# Patient Record
Sex: FEMALE | Race: WHITE
[De-identification: names, ages, dates, MRNs, and addresses within clinical notes are randomized per-mention and may not be internally consistent; named-entity substitution may affect disease eponyms.]

---

## 2019-04-01 PROBLEM — Z00.00 ENCOUNTER FOR PREVENTIVE HEALTH EXAMINATION: Status: ACTIVE | Noted: 2019-04-01

## 2019-04-15 ENCOUNTER — APPOINTMENT (OUTPATIENT)
Dept: OBGYN | Facility: CLINIC | Age: 54
End: 2019-04-15
Payer: COMMERCIAL

## 2019-04-15 VITALS
WEIGHT: 140 LBS | HEIGHT: 61 IN | BODY MASS INDEX: 26.43 KG/M2 | SYSTOLIC BLOOD PRESSURE: 114 MMHG | DIASTOLIC BLOOD PRESSURE: 71 MMHG

## 2019-04-15 DIAGNOSIS — Z78.9 OTHER SPECIFIED HEALTH STATUS: ICD-10-CM

## 2019-04-15 DIAGNOSIS — Z85.41 PERSONAL HISTORY OF MALIGNANT NEOPLASM OF CERVIX UTERI: ICD-10-CM

## 2019-04-15 DIAGNOSIS — Z01.419 ENCOUNTER FOR GYNECOLOGICAL EXAMINATION (GENERAL) (ROUTINE) W/OUT ABNORMAL FINDINGS: ICD-10-CM

## 2019-04-15 DIAGNOSIS — Z87.898 PERSONAL HISTORY OF OTHER SPECIFIED CONDITIONS: ICD-10-CM

## 2019-04-15 PROCEDURE — 99386 PREV VISIT NEW AGE 40-64: CPT

## 2019-04-15 RX ORDER — INTERFERON BETA-1A 30 MCG
KIT INTRAMUSCULAR
Refills: 0 | Status: ACTIVE | COMMUNITY

## 2019-04-15 NOTE — CHIEF COMPLAINT
[Initial Visit] : initial GYN visit [FreeTextEntry1] : Pt presents to establish care - is not due for a PAP until the summer.  \par No complaints

## 2019-04-15 NOTE — HISTORY OF PRESENT ILLNESS
[Last Mammogram ___] : Last Mammogram was [unfilled] [Last Bone Density ___] : Last bone density studies [unfilled] [Last Colonoscopy ___] : Last colonoscopy [unfilled] [Last Pap ___] : Last cervical pap smear was [unfilled] [Postmenopausal] : is postmenopausal [de-identified] : completed menopause by age 30 - not sure if related to MS. Tried HRT but then got cervical cancer.  Had LEEP 1999 - PAPs since then have been abnormal but lately have been normal.

## 2019-04-15 NOTE — PHYSICAL EXAM
[Awake] : awake [Alert] : alert [Acute Distress] : no acute distress [Mass] : no breast mass [Axillary LAD] : no axillary lymphadenopathy [Nipple Discharge] : no nipple discharge [Soft] : soft [Tender] : non tender [Oriented x3] : oriented to person, place, and time [No Bleeding] : there was no active vaginal bleeding [Uterine Adnexae] : were not tender and not enlarged [Normal] : uterus

## 2019-04-18 LAB
CYTOLOGY CVX/VAG DOC THIN PREP: NORMAL
HPV HIGH+LOW RISK DNA PNL CVX: DETECTED

## 2019-04-22 ENCOUNTER — RESULT REVIEW (OUTPATIENT)
Age: 54
End: 2019-04-22

## 2019-05-02 ENCOUNTER — OTHER (OUTPATIENT)
Age: 54
End: 2019-05-02

## 2019-05-14 ENCOUNTER — APPOINTMENT (OUTPATIENT)
Dept: ENDOCRINOLOGY | Facility: CLINIC | Age: 54
End: 2019-05-14
Payer: COMMERCIAL

## 2019-05-14 VITALS
HEART RATE: 80 BPM | BODY MASS INDEX: 26.43 KG/M2 | SYSTOLIC BLOOD PRESSURE: 110 MMHG | OXYGEN SATURATION: 98 % | DIASTOLIC BLOOD PRESSURE: 80 MMHG | WEIGHT: 140 LBS | HEIGHT: 61 IN

## 2019-05-14 DIAGNOSIS — M85.80 OTHER SPECIFIED DISORDERS OF BONE DENSITY AND STRUCTURE, UNSPECIFIED SITE: ICD-10-CM

## 2019-05-14 DIAGNOSIS — E28.8 OTHER OVARIAN DYSFUNCTION: ICD-10-CM

## 2019-05-14 PROCEDURE — 99244 OFF/OP CNSLTJ NEW/EST MOD 40: CPT

## 2019-05-14 RX ORDER — ALBUTEROL SULFATE 90 UG/1
108 (90 BASE) AEROSOL, METERED RESPIRATORY (INHALATION)
Qty: 18 | Refills: 0 | Status: COMPLETED | COMMUNITY
Start: 2019-05-02

## 2019-05-14 RX ORDER — BROMPHENIRAMINE MALEATE, PSEUDOEPHEDRINE HYDROCHLORIDE, 2; 30; 10 MG/5ML; MG/5ML; MG/5ML
30-2-10 SYRUP ORAL
Qty: 200 | Refills: 0 | Status: COMPLETED | COMMUNITY
Start: 2019-05-02

## 2019-05-14 RX ORDER — FLUTICASONE PROPIONATE 110 UG/1
110 AEROSOL, METERED RESPIRATORY (INHALATION)
Qty: 12 | Refills: 0 | Status: COMPLETED | COMMUNITY
Start: 2019-05-02

## 2019-05-14 NOTE — CONSULT LETTER
[Dear  ___] : Dear  [unfilled], [Consult Letter:] : I had the pleasure of evaluating your patient, [unfilled]. [Please see my note below.] : Please see my note below. [Consult Closing:] : Thank you very much for allowing me to participate in the care of this patient.  If you have any questions, please do not hesitate to contact me. [Sincerely,] : Sincerely, [FreeTextEntry3] : Arnaldo Alamo MD\par  [FreeTextEntry2] : Maritza Hensley

## 2019-05-14 NOTE — HISTORY OF PRESENT ILLNESS
[None] : The patient is not currently taking any medication for this problem [Low Vit D Intake/Exposure] : low vitamin D intake [Premat. Menopause (natural)] : premature menopause which occurred naturally [Family History of Breast Cancer] : family history of breast cancer [Loss of Balance] : loss of balance [Amenorrhea] : no past or present history of amenorrhea [Low Calcium Intake] : no past or present history of low calcium intake [Kidney Stones] : no history of kidney stones [Disordered Eating] : no past or present history of disordered eating [Taking Steroids] : no past or present history of taking steroids [Excessive Alcohol Intake] : no past or present history of excessive alcohol intake [Excessive Soda] : no past or present history of excessive soda intake [Sedentary] : no past or present history of a sedentary lifestyle [High Fall Risk] : no fall risk [Current Smoking___(ppd)] : not currently smoking [Frequent Falls] : no frequent falls [Uses a Walker/Cane] : no use of a walker/cane [Family History of Osteoporosis] : no family history of osteoporosis [Family History of Hip Fracture] : no family history of hip fracture [Regular Dental Follow-Up] : no regular dental follow-up [Hyperparathyroidism] : no hyperparathyroidism [History of Radiation Therapy] : no history of radiation therapy [History of Blood Clots] : no history of blood clots [Inability to Stand Straight] : no inability to stand straight [Loss of Height] : no loss of height [Difficulty with Stairs] : no difficulty with stairs [Difficulty Ambulating] : no difficulty ambulating [Difficulty with Posture] : no difficulty with posture [Arthralgias] : no arthralgias [Weight Gain] : no weight gain [Change in Clothing Fit] : no change in clothing fit [Myalgias] : no myalgias [Hip Pain] : no hip pain [Previous Fragility Fracture] : no previous fragility fracture [Wrist Pain] : no wrist pain [FreeTextEntry1] : leafy cream, she eats cheese, yogurt.  She was taking Vitamin D before and stopped around 2000.

## 2019-05-14 NOTE — ASSESSMENT
[FreeTextEntry1] : Ms. EMANUEL MONROY is a very pleasant 54 year old female with history of MS, cervical CA, premature ovarian failure, here to establish endocrine care for osteopenia. \par \par \par #1 Osteopenia: T score of -2.2, site unknown.  Will try to contact radiologist to get complete report.  Osteopenia. \par He currently does not meet treatment criteria for osteopenia.  However, we will definitely well secondary cause of osteopenia in this young female.  The most likely reason for her worsening bone loss may be attributed to premature ovarian failure in the setting as possible multiple sclerosis.  She's not able to be on hormone replacement therapy secondary to a history of cervical cancer. \par We will obtain the following labs\par Complete metabolic panel and phosphorous\par Serum Dihydroxyvitamin D\par TSH, free T4\par \par I have recommended the following\par -Get enough calcium and vitamin D, either through diet or\par supplements (at least 1,000-1,200 mg of calcium; 1000-2000IU of vitamin D daily under age 50 or 800-1,000 IU after age 50)\par - Do weight-bearing exercises and stay physically fit, will give referral to physical therapy.  \par - Avoid smoking\par - Don’t drink too much alcohol\par \par Discussed the indications for treatment as well as treatment options including, Bisphosphonates (alendronate, risedronate, ibandronate, zoledronic acid, Denosumab, Raloxifene, as well as teriparatide.  \par \par We will followup with patient in one year.  We will repeat a bone mineral density at that time in our office.\par

## 2019-05-14 NOTE — PHYSICAL EXAM
[No Acute Distress] : no acute distress [Alert] : alert [Well Nourished] : well nourished [Well Developed] : well developed [Normal Sclera/Conjunctiva] : normal sclera/conjunctiva [No Proptosis] : no proptosis [EOMI] : extra ocular movement intact [Thyroid Not Enlarged] : the thyroid was not enlarged [Normal Oropharynx] : the oropharynx was normal [No Respiratory Distress] : no respiratory distress [No Thyroid Nodules] : there were no palpable thyroid nodules [No Accessory Muscle Use] : no accessory muscle use [Clear to Auscultation] : lungs were clear to auscultation bilaterally [Normal Rate] : heart rate was normal  [Normal S1, S2] : normal S1 and S2 [Regular Rhythm] : with a regular rhythm [Pedal Pulses Normal] : the pedal pulses are present [No Edema] : there was no peripheral edema [Normal Bowel Sounds] : normal bowel sounds [Not Tender] : non-tender [Soft] : abdomen soft [Post Cervical Nodes] : posterior cervical nodes [Not Distended] : not distended [Axillary Nodes] : axillary nodes [Anterior Cervical Nodes] : anterior cervical nodes [Normal] : normal and non tender [No Spinal Tenderness] : no spinal tenderness [Spine Straight] : spine straight [No Stigmata of Cushings Syndrome] : no stigmata of cushings syndrome [Normal Gait] : normal gait [Normal Strength/Tone] : muscle strength and tone were normal [No Rash] : no rash [Normal Reflexes] : deep tendon reflexes were 2+ and symmetric [Oriented x3] : oriented to person, place, and time [No Tremors] : no tremors [Acanthosis Nigricans] : no acanthosis nigricans [de-identified] : slight left eye droop

## 2019-05-23 LAB
24R-OH-CALCIDIOL SERPL-MCNC: 98.3 PG/ML
25(OH)D3 SERPL-MCNC: 40.3 NG/ML
ALBUMIN SERPL ELPH-MCNC: 4.5 G/DL
ALP BLD-CCNC: 82 U/L
ALT SERPL-CCNC: 26 U/L
ANION GAP SERPL CALC-SCNC: 13 MMOL/L
AST SERPL-CCNC: 20 U/L
BILIRUB SERPL-MCNC: 0.2 MG/DL
BUN SERPL-MCNC: 8 MG/DL
CALCIUM SERPL-MCNC: 10 MG/DL
CALCIUM SERPL-MCNC: 10 MG/DL
CHLORIDE SERPL-SCNC: 101 MMOL/L
CO2 SERPL-SCNC: 27 MMOL/L
CREAT SERPL-MCNC: 0.7 MG/DL
GLUCOSE SERPL-MCNC: 84 MG/DL
MAGNESIUM SERPL-MCNC: 1.9 MG/DL
PARATHYROID HORMONE INTACT: 34 PG/ML
PHOSPHATE SERPL-MCNC: 3.2 MG/DL
POTASSIUM SERPL-SCNC: 4.6 MMOL/L
PROT SERPL-MCNC: 7.1 G/DL
SODIUM SERPL-SCNC: 141 MMOL/L
T4 FREE SERPL-MCNC: 0.9 NG/DL
TSH SERPL-ACNC: 2.17 UIU/ML

## 2019-07-10 ENCOUNTER — APPOINTMENT (OUTPATIENT)
Dept: ENDOCRINOLOGY | Facility: CLINIC | Age: 54
End: 2019-07-10

## 2020-12-19 ENCOUNTER — OUTPATIENT (OUTPATIENT)
Dept: OUTPATIENT SERVICES | Facility: HOSPITAL | Age: 55
LOS: 1 days | End: 2020-12-19
Payer: COMMERCIAL

## 2020-12-19 ENCOUNTER — APPOINTMENT (OUTPATIENT)
Dept: MRI IMAGING | Facility: CLINIC | Age: 55
End: 2020-12-19
Payer: COMMERCIAL

## 2020-12-19 DIAGNOSIS — Z00.8 ENCOUNTER FOR OTHER GENERAL EXAMINATION: ICD-10-CM

## 2020-12-19 PROCEDURE — 70553 MRI BRAIN STEM W/O & W/DYE: CPT | Mod: 26

## 2020-12-19 PROCEDURE — 70553 MRI BRAIN STEM W/O & W/DYE: CPT

## 2020-12-21 PROBLEM — Z01.419 ENCOUNTER FOR ANNUAL ROUTINE GYNECOLOGICAL EXAMINATION: Status: RESOLVED | Noted: 2019-04-15 | Resolved: 2020-12-21

## 2021-07-22 ENCOUNTER — EMERGENCY (EMERGENCY)
Facility: HOSPITAL | Age: 56
LOS: 1 days | Discharge: HOME | End: 2021-07-22
Attending: STUDENT IN AN ORGANIZED HEALTH CARE EDUCATION/TRAINING PROGRAM
Payer: COMMERCIAL

## 2021-07-22 VITALS
HEIGHT: 61 IN | WEIGHT: 139.99 LBS | OXYGEN SATURATION: 97 % | TEMPERATURE: 98 F | RESPIRATION RATE: 18 BRPM | HEART RATE: 100 BPM | DIASTOLIC BLOOD PRESSURE: 65 MMHG | SYSTOLIC BLOOD PRESSURE: 123 MMHG

## 2021-07-22 VITALS
DIASTOLIC BLOOD PRESSURE: 77 MMHG | SYSTOLIC BLOOD PRESSURE: 127 MMHG | OXYGEN SATURATION: 99 % | HEART RATE: 95 BPM | RESPIRATION RATE: 20 BRPM | TEMPERATURE: 98 F

## 2021-07-22 PROCEDURE — 73590 X-RAY EXAM OF LOWER LEG: CPT

## 2021-07-22 PROCEDURE — 29515 APPLICATION SHORT LEG SPLINT: CPT

## 2021-07-22 PROCEDURE — 73620 X-RAY EXAM OF FOOT: CPT | Mod: 26,RT

## 2021-07-22 PROCEDURE — 73610 X-RAY EXAM OF ANKLE: CPT | Mod: 26,RT

## 2021-07-22 PROCEDURE — 29515 APPLICATION SHORT LEG SPLINT: CPT | Mod: RT

## 2021-07-22 PROCEDURE — 73620 X-RAY EXAM OF FOOT: CPT

## 2021-07-22 PROCEDURE — 73610 X-RAY EXAM OF ANKLE: CPT

## 2021-07-22 PROCEDURE — 73590 X-RAY EXAM OF LOWER LEG: CPT | Mod: 26,RT

## 2021-07-22 PROCEDURE — 99284 EMERGENCY DEPT VISIT MOD MDM: CPT | Mod: 25

## 2021-07-22 RX ADMIN — Medication 375 MILLIGRAM(S): at 17:29

## 2021-07-22 NOTE — ED ADULT NURSE NOTE - OBJECTIVE STATEMENT
55 y/o female presents to ED via EMS c/o R ankle pain. pt states she was getting off bus and stepped into pothole, twisting ankle. Per EMS, pt is able to bear some weight though verbalized pain. R lateral ankle appears swollen. No obvious deformity noted.

## 2021-07-22 NOTE — ED ADULT NURSE NOTE - CCCP TRG CHIEF CMPLNT
[facility] [place]  [address] [address]  [phone]      Ly Stone :1980 MRN:[id number]    2021 Time Session Began: 04:30pm  Time Session Ended: 5:15pm    Due to COVID-19 precautions, this visit was performed via live interactive two-way Video visit with patient's verbal consent.   Clinician Location:Clinic.  Patient Location: Home.  Verified patient identity:  [x] Yes    Session Type:45 Minute Therapy     Others Present: Patient was seen alone    Intervention: Behavioral, Cognitive, Supportive    Suicide/Homicide/Violence Ideation: No    If Yes, explain: None reported    Current Outpatient Medications   Medication Sig   • FLUoxetine (PROzac) 10 MG capsule Take 20 mg (2 tablets) in AM and 10 mg (1 tablet) in PM for one week then increase to 20 mg twice daily.   • fluconazole (Diflucan) 150 MG tablet Take 1 tablet by mouth 1 time for 1 dose. Dose may be repeated after 72 hours if needed.   • HYDROcodone-acetaminophen (NORCO) 5-325 MG per tablet Take 1 tablet by mouth every 6 hours as needed for Pain.   • ibuprofen (MOTRIN) 600 MG tablet TAKE 1 TABLET BY MOUTH EVERY 6 TO 8 HOURS AS NEEDED FOR PAIN   • cyclobenzaprine (FLEXERIL) 10 MG tablet Take 1 tablet by mouth 3 times daily as needed for Muscle spasms.   • buPROPion (WELLBUTRIN SR) 100 MG 12 hr tablet Take 1 tablet by mouth 2 times daily.   • cetirizine (ZYRTEC) 10 MG tablet Take 1 tablet by mouth daily.     No current facility-administered medications for this visit.       Change in Medication(s) Reported: No  If Yes, explain: Patient began taking her medication at a different time of day to determine if that would help to reduce anxiety in the AM. Patient finds new medication regimen helpful and will continue. Working with her doctor to determine if she would like to increase or remain at same dose.     Patient/Family  Education Provided: Yes  Patient/Family Displays Understanding: Yes    If No, explain: n/a    Chief complaint in patient's own words: \"I lost one of my close friends\"    Progress Note containing chief complaint and symptoms/problems related to the complaint:    (Data/Action/Response/Plan)    D: Ly is a 41 year old female who presents to treatment with symptoms of anxiety and depression. Patient reports loss of close friend and processed the experience thus far. Patient identified goals to refocus this week and slowly continue to process the loss.      Action of the provider: Patient was provided with support. Patient's concerns related to recent loss  were processed and reframed to build insight. Cognitions shared by patient were acknowledged and exploration was encouraged. Normalized grief and offered support.  Intervention: Behavioral, Cognitive, Supportive     Response of patient: Ly was alert and oriented x4. Patient presented motivated. Patient presented as calm and cooperative. Mood appeared depressive with congruent affect.  Eye contact was appropriate. Speech was normal in rate, tone, and volume. Motor activity was unremarkable. Thought process was future oriented and goal directed. Patient processed death of close friend. Patient was open and expressive.      P: Patient continue goal of organization. Focus on processing loss.     Need for Community Resources Assessed: No    Resources Needed: N/A    If Yes, what resources: n/a    Primary Diagnosis: Adjustment Disorder w/Anxiety and Adjustment Disorder w/Depressed Mood  : 1 Mild    Treatment Plan: Unchanged    Discharge Plan: Strategies Discussed to Maintain Gains, Continue with M.D.    Next Appointment: Within 2 weeks  Ly Kaur PSYD              pain, ankle

## 2021-07-22 NOTE — ED ADULT NURSE NOTE - NS ED NURSE LEVEL OF CONSCIOUSNESS MENTAL STATUS
11 AM:   Take 1 tablet of Lisinopril-HCTZ (blood pressure plus fluid pill)   Take 1 tablet of Alogliptin (diabetes)   Take 1 tablet of Atorvastatin (cholesterol)   Take 1 tablet of Baby aspirin 81 mg     11 PM    Take 27 units of Lantus      Insulin Dosing with Sliding Scale:       Inject Lantus (Gardner)  27 units nightly  - this is your basal insulin    Monitor blood sugar before meals and at bedtime.  Call physician if blood sugar < 60 or > 350 for two consecutive readings.          Glucose  Novolog Insulin Subcutaneous (Orange)        0 - 60   Orange juice or glucose tablet, hold insulin      No additional insulin   201-250  2 units   251-300  4 units   301-350  6 units   351-400  8 units   >400   10 units then call physician    
Awake/Alert/Cooperative

## 2021-07-22 NOTE — ED PROVIDER NOTE - PATIENT PORTAL LINK FT
You can access the FollowMyHealth Patient Portal offered by [facility] by registering at the following website: [email or web address]. By joining [facility]’s FollowMyHealth portal, you will also be able to view your health information using other applications (apps) compatible with our system.

## 2021-07-22 NOTE — ED PROVIDER NOTE - NSFOLLOWUPCLINICS_GEN_ALL_ED_FT
[facility]  Orthopedic Surgery  [address] [place] [address]  Phone: [phone]  Fax:     [facility]  Orthopedic Surgery  [address], 3rd & 4th [place] [address]  Phone: [phone]  Fax:   Follow Up Time: 4-6 Days    [facility]  Orthopedic Surgery  [address] [address]  Phone: [phone]  Fax:

## 2021-07-22 NOTE — ED PROVIDER NOTE - NSFOLLOWUPINSTRUCTIONS_ED_ALL_ED_FT
You were evaluated in the emergency department and found to have an ankle fracture.     Please follow up with orthopedics as discussed in 3-5 days.     Do not bear weight on ankle, use crutches. Keep cast clean and dry. Elevate the leg. Use ice. You can take tylenol or ibuprofen for pain. To control your pain at home, you should take Ibuprofen 400 mg along with Tylenol 650mg-1000mg every 6 to 8 hours. Limit your maximum daily Tylenol from all sources to 4000mg. Be aware that many other medications contain acetaminophen which is also known as Tylenol. Taking Tylenol and Ibuprofen together has been shown to be more effective at relieving pain than taking them separately. These are both over the counter medications that you can  at your local pharmacy without a prescription. You need to respect all of the warnings on the bottles. You shouldn’t take these medications for more than a week without following up with your doctor. Both medications come with certain risks and side effects that you need to discuss with your doctor, especially if you are taking them for a prolonged period    Please return to the emergency department with any new or worsening symptoms, including:  -Worsening pain  -Numbness or tingling in leg  -You can't feel the ankle  -Your foot becomes discolored (black or blue)    Ankle Fracture    WHAT YOU NEED TO KNOW:  An ankle fracture is a break in 1 or more of the bones in your ankle.  Foot Anatomy  DISCHARGE INSTRUCTIONS:  Call your local emergency number (911 in the [place]) for any of the following:  You feel lightheaded, short of breath, and have chest pain.  You cough up blood.  Seek care immediately if:  Your leg feels warm, tender, and painful. It may look swollen and red.  Blood soaks through your bandage.  You have severe pain in your ankle.  Your cast feels too tight.  Your foot or toes are cold or numb.  Your foot or toenails turn blue or gray.  Call your doctor if:  Your splint feels too tight.  Your swelling has increased or returned.  You have a fever.  Your pain does not go away, even after treatment.  You have questions or concerns about your condition or care.  Medicines: You may need any of the following:  Acetaminophen decreases pain and fever. It is available without a doctor's order. Ask how much to take and how often to take it. Follow directions. Read the labels of all other medicines you are using to see if they also contain acetaminophen, or ask your doctor or pharmacist. Acetaminophen can cause liver damage if not taken correctly. Do not use more than 4 grams (4,000 milligrams) total of acetaminophen in one day.  NSAIDs, such as ibuprofen, help decrease swelling, pain, and fever. This medicine is available with or without a doctor's order. NSAIDs can cause stomach bleeding or kidney problems in certain people. If you take blood thinner medicine, always ask your healthcare provider if NSAIDs are safe for you. Always read the medicine label and follow directions.  Prescription pain medicine may be given. Ask your healthcare provider how to take this medicine safely. Some prescription pain medicines contain acetaminophen. Do not take other medicines that contain acetaminophen without talking to your healthcare provider. Too much acetaminophen may cause liver damage. Prescription pain medicine may cause constipation. Ask your healthcare provider how to prevent or treat constipation.  Take your medicine as directed. Contact your healthcare provider if you think your medicine is not helping or if you have side effects. Tell him or her if you are allergic to any medicine. Keep a list of the medicines, vitamins, and herbs you take. Include the amounts, and when and why you take them. Bring the list or the pill bottles to follow-up visits. Carry your medicine list with you in case of an emergency.  Follow up with your doctor in 1 to 2 days: Your fracture may need to be reduced (bones pushed back into place) or you may need surgery. Write down your questions so you remember to ask them during your visits.  Support devices: You will be given a brace, cast, or splint to limit your movement and protect your ankle. You may need to use crutches to protect your ankle and decrease your pain as you move around. Do not remove your device and do not put weight on your injured ankle.  Splint and cast care: Cover the splint or cast before you bathe so it does not get wet. Tape 2 plastic trash bags to your skin above the cast. Try to keep your ankle out of the water as much as possible.  Rest: Rest your ankle so that it can heal. Return to normal activities as directed.  Ice: Apply ice on your ankle for 15 to 20 minutes every hour or as directed. Use an ice pack, or put crushed ice in a plastic bag. Cover it with a towel. Ice helps prevent tissue damage and decreases swelling and pain.  Elevate: Elevate your ankle above the level of your heart as often as you can. This will help decrease swelling and pain. Prop your ankle on pillows or blankets to keep it elevated comfortably.

## 2021-07-22 NOTE — ED PROVIDER NOTE - CLINICAL SUMMARY MEDICAL DECISION MAKING FREE TEXT BOX
55 y/o F p/w right ankle pain after rolling ankle getting off bus. Concern for fracture vs strain. Will check X-ray, pain control, and reassess. 57 y/o F p/w right ankle pain after rolling ankle getting off bus. Concern for fracture vs strain. Will check X-ray, pain control, and reassess. - Nancy Licea DO

## 2021-07-22 NOTE — ED PROVIDER NOTE - PROGRESS NOTE DETAILS
Holli Dwyer PGY-2: +distal fibular fx, non displaced. Will place in posterior splint, crutches. Ortho f/u. Holli Dwyer PGY-2: Patient placed in posterior splint. Holli Dwyer PGY-2: Patient placed in posterior splint. Instructed how to use crutches.

## 2021-07-22 NOTE — ED PROVIDER NOTE - MUSCULOSKELETAL MINIMAL EXAM
right ankle with swelling and tenderness to lateral malleolus. Pain with inversion. DP pulses equal bilaterally. cap refill <2 seconds. Sensation intact. No pain to right shin or knee. Right knee with FROM.

## 2021-07-22 NOTE — ED PROVIDER NOTE - OBJECTIVE STATEMENT
57 y/o F p/w R ankle after stepping off shuttle bus and rolling ankle. Did not fall and hit head. No LOC. Has not walked since incident. Pt repots pain to lateral ankle. Denies numbness, tingling.

## 2021-07-27 ENCOUNTER — APPOINTMENT (OUTPATIENT)
Dept: ORTHOPEDIC SURGERY | Facility: CLINIC | Age: 56
End: 2021-07-27
Payer: OTHER MISCELLANEOUS

## 2021-07-27 PROCEDURE — 99203 OFFICE O/P NEW LOW 30 MIN: CPT | Mod: 57

## 2021-07-27 PROCEDURE — 27786 TREATMENT OF ANKLE FRACTURE: CPT | Mod: RT

## 2021-07-27 PROCEDURE — 99072 ADDL SUPL MATRL&STAF TM PHE: CPT

## 2021-07-27 PROCEDURE — 73610 X-RAY EXAM OF ANKLE: CPT | Mod: RT

## 2021-08-26 ENCOUNTER — NON-APPOINTMENT (OUTPATIENT)
Age: 56
End: 2021-08-26

## 2021-09-10 ENCOUNTER — TRANSCRIPTION ENCOUNTER (OUTPATIENT)
Age: 56
End: 2021-09-10

## 2021-09-10 ENCOUNTER — APPOINTMENT (OUTPATIENT)
Dept: ORTHOPEDIC SURGERY | Facility: CLINIC | Age: 56
End: 2021-09-10
Payer: OTHER MISCELLANEOUS

## 2021-09-10 ENCOUNTER — NON-APPOINTMENT (OUTPATIENT)
Age: 56
End: 2021-09-10

## 2021-09-10 PROCEDURE — 99024 POSTOP FOLLOW-UP VISIT: CPT

## 2021-09-30 ENCOUNTER — APPOINTMENT (OUTPATIENT)
Dept: ORTHOPEDIC SURGERY | Facility: CLINIC | Age: 56
End: 2021-09-30
Payer: OTHER MISCELLANEOUS

## 2021-09-30 DIAGNOSIS — S82.891D OTHER FRACTURE OF RIGHT LOWER LEG, SUBSEQUENT ENCOUNTER FOR CLOSED FRACTURE WITH ROUTINE HEALING: ICD-10-CM

## 2021-09-30 DIAGNOSIS — S96.911D STRAIN OF UNSPECIFIED MUSCLE AND TENDON AT ANKLE AND FOOT LEVEL, RIGHT FOOT, SUBSEQUENT ENCOUNTER: ICD-10-CM

## 2021-09-30 PROCEDURE — 99024 POSTOP FOLLOW-UP VISIT: CPT

## 2021-12-13 ENCOUNTER — APPOINTMENT (OUTPATIENT)
Dept: MRI IMAGING | Facility: CLINIC | Age: 56
End: 2021-12-13
Payer: COMMERCIAL

## 2021-12-13 ENCOUNTER — OUTPATIENT (OUTPATIENT)
Dept: OUTPATIENT SERVICES | Facility: HOSPITAL | Age: 56
LOS: 1 days | End: 2021-12-13
Payer: COMMERCIAL

## 2021-12-13 DIAGNOSIS — Z00.8 ENCOUNTER FOR OTHER GENERAL EXAMINATION: ICD-10-CM

## 2021-12-13 PROCEDURE — 70553 MRI BRAIN STEM W/O & W/DYE: CPT | Mod: 26

## 2021-12-13 PROCEDURE — 70546 MR ANGIOGRAPH HEAD W/O&W/DYE: CPT | Mod: 26,59

## 2021-12-13 PROCEDURE — 70553 MRI BRAIN STEM W/O & W/DYE: CPT

## 2021-12-13 PROCEDURE — A9585: CPT

## 2021-12-13 PROCEDURE — 70546 MR ANGIOGRAPH HEAD W/O&W/DYE: CPT

## 2022-01-26 ENCOUNTER — APPOINTMENT (OUTPATIENT)
Dept: NEUROSURGERY | Facility: CLINIC | Age: 57
End: 2022-01-26
Payer: COMMERCIAL

## 2022-01-26 VITALS
WEIGHT: 140 LBS | OXYGEN SATURATION: 99 % | TEMPERATURE: 98.7 F | DIASTOLIC BLOOD PRESSURE: 76 MMHG | SYSTOLIC BLOOD PRESSURE: 127 MMHG | BODY MASS INDEX: 26.43 KG/M2 | HEART RATE: 53 BPM | HEIGHT: 61 IN

## 2022-01-26 DIAGNOSIS — Z83.3 FAMILY HISTORY OF DIABETES MELLITUS: ICD-10-CM

## 2022-01-26 PROCEDURE — 99205 OFFICE O/P NEW HI 60 MIN: CPT

## 2022-01-26 PROCEDURE — 99214 OFFICE O/P EST MOD 30 MIN: CPT

## 2022-01-28 NOTE — HISTORY OF PRESENT ILLNESS
[FreeTextEntry1] : Incidental small left ICA aneurysm [de-identified] : 56 year old with PMH of MS (2003)presents to the office for a neurosurgical consultation for a small aneurysm. She follows with Dr. Early for her MS. 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 MRI showed chronic white matter lesions. \par She has a positive family history of her mom being treated for a left Pcomm artery aneurysm with Platinum coils by Dr. Rangel Acevedo in 2011.\par She denies headaches, dizziness, or any other neurological symptoms. \par Plan: Cerebral angiogram by Dr. Valencia

## 2022-01-28 NOTE — PHYSICAL EXAM
[General Appearance - Alert] : alert [General Appearance - In No Acute Distress] : in no acute distress [Oriented To Time, Place, And Person] : oriented to person, place, and time [Impaired Insight] : insight and judgment were intact [Affect] : the affect was normal [Person] : oriented to person [Place] : oriented to place [Time] : oriented to time [Short Term Intact] : short term memory intact [Remote Intact] : remote memory intact [Span Intact] : the attention span was normal [Concentration Intact] : normal concentrating ability [Fluency] : fluency intact [Comprehension] : comprehension intact [Current Events] : adequate knowledge of current events [Past History] : adequate knowledge of personal past history [Vocabulary] : adequate range of vocabulary [Cranial Nerves Oculomotor (III)] : extraocular motion intact [Cranial Nerves Trigeminal (V)] : facial sensation intact symmetrically [Cranial Nerves Facial (VII)] : face symmetrical [Cranial Nerves Vestibulocochlear (VIII)] : hearing was intact bilaterally [Cranial Nerves Glossopharyngeal (IX)] : tongue and palate midline [Cranial Nerves Accessory (XI - Cranial And Spinal)] : head turning and shoulder shrug symmetric [Cranial Nerves Hypoglossal (XII)] : there was no tongue deviation with protrusion [Motor Tone] : muscle tone was normal in all four extremities [Motor Strength] : muscle strength was normal in all four extremities [No Muscle Atrophy] : normal bulk in all four extremities [Sensation Tactile Decrease] : light touch was intact [Balance] : balance was intact [Extraocular Movements] : extraocular movements were intact [Outer Ear] : the ears and nose were normal in appearance [Neck Appearance] : the appearance of the neck was normal [] : no respiratory distress [Respiration, Rhythm And Depth] : normal respiratory rhythm and effort [Exaggerated Use Of Accessory Muscles For Inspiration] : no accessory muscle use [Heart Rate And Rhythm] : heart rate was normal and rhythm regular [Abnormal Walk] : normal gait [Involuntary Movements] : no involuntary movements were seen [Skin Color & Pigmentation] : normal skin color and pigmentation [Skin Turgor] : normal skin turgor [Past-pointing] : there was no past-pointing [Tremor] : no tremor present

## 2022-01-28 NOTE — ASSESSMENT
[FreeTextEntry1] : 2022\par \par Ann Early MD\par [facility]\par  [facility], [address]\[place] [address]\par \par Re:	Renae Loza\par :	1965\par Dear Ann\par \par Thank you so much for sending me Mrs. Loza in neurosurgical consultation.  The patient is a 56 year old right handed female who is originally a native of [place], but has lived her for three years. She has a history of relapsing and remitting multiple sclerosis, and has been seen by yourself twice. On her last visit an MRI and an MRA were performed. The MRI showed stable, extensive, confluent, T2 FLAIR hyperintense signal within the white matter, consistent with MS, and the MRA shows a possible 3.6 mm superior hypophyseal aneurysm. She is here for review of the aneurysm.\par \par Her past medical history is positive for the MS as mentioned. Past surgical history is positive for tonsils and a procedure for cervical carcinoma which she says is now cured. She has a family history of a mother who had a left posterior communicating artery aneurysm, coiled by Dr. Rangel Acevedo. 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 She is neurologically intact.\par \par IMPRESSION: I looked at this very carefully, and there is a wide-based aneurysm on the left carotid, quite proximal, but outside of the cavernous sinus in the subarachnoid compartment. It is unclear exactly what the dimensions are, but given her relative young age, and given her family history, I believe she is a candidate for cerebral angiography. We are arranging outpatient cerebral angiography, and we will then characterize this aneurysm, measure it, and see if she is a candidate for treatment. If she is a candidate for treatment, she may be a candidate for a flow diverting stent.\par \par I will keep you posted regarding the results of the angiogram. I explained everything in detail to the patient, and I would just like to take one moment to thank you so much for your continued support over the years. It is very meaningful to me. I thank you so much.\par \par Sincerely,\par \par \par \par Rodri Castorena M.D., F.DEYVI.C.S.\par [facility]\par \par \par \par

## 2022-01-31 PROBLEM — Z83.3 FAMILY HISTORY OF TYPE 2 DIABETES MELLITUS: Status: ACTIVE | Noted: 2019-04-15

## 2022-01-31 NOTE — HISTORY OF PRESENT ILLNESS
[FreeTextEntry1] : The patient is a 56 year old right handed female who is originally a native of [place], but has lived here for three years. She has a history of relapsing and remitting multiple sclerosis. Past surgical history tonsillectomy and a procedure for cervical carcinoma. She has a family history of a mother who had a left posterior communicating artery aneurysm, coiled by Dr. Rangel Acevedo. 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 She is neurologically intact.\par \par She was recently evaluated by [facility] Dr. Castorena due to incidental aneurysm. 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 \par

## 2022-01-31 NOTE — PHYSICAL EXAM

## 2022-01-31 NOTE — ASSESSMENT
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

## 2022-02-14 ENCOUNTER — OUTPATIENT (OUTPATIENT)
Dept: OUTPATIENT SERVICES | Facility: HOSPITAL | Age: 57
LOS: 1 days | End: 2022-02-14
Payer: COMMERCIAL

## 2022-02-14 VITALS
WEIGHT: 147.71 LBS | SYSTOLIC BLOOD PRESSURE: 118 MMHG | TEMPERATURE: 98 F | DIASTOLIC BLOOD PRESSURE: 68 MMHG | RESPIRATION RATE: 19 BRPM | OXYGEN SATURATION: 98 % | HEIGHT: 61 IN | HEART RATE: 58 BPM

## 2022-02-14 DIAGNOSIS — Z98.890 OTHER SPECIFIED POSTPROCEDURAL STATES: Chronic | ICD-10-CM

## 2022-02-14 DIAGNOSIS — Z01.818 ENCOUNTER FOR OTHER PREPROCEDURAL EXAMINATION: ICD-10-CM

## 2022-02-14 DIAGNOSIS — Z91.89 OTHER SPECIFIED PERSONAL RISK FACTORS, NOT ELSEWHERE CLASSIFIED: ICD-10-CM

## 2022-02-14 DIAGNOSIS — I67.1 CEREBRAL ANEURYSM, NONRUPTURED: ICD-10-CM

## 2022-02-14 LAB
A1C WITH ESTIMATED AVERAGE GLUCOSE RESULT: 6 % — HIGH (ref 4–5.6)
ALBUMIN SERPL ELPH-MCNC: 4.3 G/DL — SIGNIFICANT CHANGE UP (ref 3.3–5.2)
ALP SERPL-CCNC: 88 U/L — SIGNIFICANT CHANGE UP (ref 40–120)
ALT FLD-CCNC: 27 U/L — SIGNIFICANT CHANGE UP
ANION GAP SERPL CALC-SCNC: 10 MMOL/L — SIGNIFICANT CHANGE UP (ref 5–17)
APTT BLD: 30.7 SEC — SIGNIFICANT CHANGE UP (ref 27.5–35.5)
AST SERPL-CCNC: 25 U/L — SIGNIFICANT CHANGE UP
BASOPHILS # BLD AUTO: 0.04 K/UL — SIGNIFICANT CHANGE UP (ref 0–0.2)
BASOPHILS NFR BLD AUTO: 0.7 % — SIGNIFICANT CHANGE UP (ref 0–2)
BILIRUB SERPL-MCNC: 0.3 MG/DL — LOW (ref 0.4–2)
BLD GP AB SCN SERPL QL: SIGNIFICANT CHANGE UP
BUN SERPL-MCNC: 10.7 MG/DL — SIGNIFICANT CHANGE UP (ref 8–20)
CALCIUM SERPL-MCNC: 9.5 MG/DL — SIGNIFICANT CHANGE UP (ref 8.6–10.2)
CHLORIDE SERPL-SCNC: 106 MMOL/L — SIGNIFICANT CHANGE UP (ref 98–107)
CO2 SERPL-SCNC: 26 MMOL/L — SIGNIFICANT CHANGE UP (ref 22–29)
CREAT SERPL-MCNC: 0.76 MG/DL — SIGNIFICANT CHANGE UP (ref 0.5–1.3)
EOSINOPHIL # BLD AUTO: 0.25 K/UL — SIGNIFICANT CHANGE UP (ref 0–0.5)
EOSINOPHIL NFR BLD AUTO: 4.1 % — SIGNIFICANT CHANGE UP (ref 0–6)
ESTIMATED AVERAGE GLUCOSE: 126 MG/DL — HIGH (ref 68–114)
GLUCOSE SERPL-MCNC: 94 MG/DL — SIGNIFICANT CHANGE UP (ref 70–99)
HCT VFR BLD CALC: 40.3 % — SIGNIFICANT CHANGE UP (ref 34.5–45)
HGB BLD-MCNC: 13.3 G/DL — SIGNIFICANT CHANGE UP (ref 11.5–15.5)
IMM GRANULOCYTES NFR BLD AUTO: 0.2 % — SIGNIFICANT CHANGE UP (ref 0–1.5)
INR BLD: 1.02 RATIO — SIGNIFICANT CHANGE UP (ref 0.88–1.16)
LYMPHOCYTES # BLD AUTO: 0.99 K/UL — LOW (ref 1–3.3)
LYMPHOCYTES # BLD AUTO: 16.2 % — SIGNIFICANT CHANGE UP (ref 13–44)
MCHC RBC-ENTMCNC: 29.6 PG — SIGNIFICANT CHANGE UP (ref 27–34)
MCHC RBC-ENTMCNC: 33 GM/DL — SIGNIFICANT CHANGE UP (ref 32–36)
MCV RBC AUTO: 89.6 FL — SIGNIFICANT CHANGE UP (ref 80–100)
MONOCYTES # BLD AUTO: 0.44 K/UL — SIGNIFICANT CHANGE UP (ref 0–0.9)
MONOCYTES NFR BLD AUTO: 7.2 % — SIGNIFICANT CHANGE UP (ref 2–14)
MRSA PCR RESULT.: SIGNIFICANT CHANGE UP
NEUTROPHILS # BLD AUTO: 4.4 K/UL — SIGNIFICANT CHANGE UP (ref 1.8–7.4)
NEUTROPHILS NFR BLD AUTO: 71.6 % — SIGNIFICANT CHANGE UP (ref 43–77)
PLATELET # BLD AUTO: 263 K/UL — SIGNIFICANT CHANGE UP (ref 150–400)
POTASSIUM SERPL-MCNC: 3.8 MMOL/L — SIGNIFICANT CHANGE UP (ref 3.5–5.3)
POTASSIUM SERPL-SCNC: 3.8 MMOL/L — SIGNIFICANT CHANGE UP (ref 3.5–5.3)
PROT SERPL-MCNC: 6.8 G/DL — SIGNIFICANT CHANGE UP (ref 6.6–8.7)
PROTHROM AB SERPL-ACNC: 11.8 SEC — SIGNIFICANT CHANGE UP (ref 10.6–13.6)
RBC # BLD: 4.5 M/UL — SIGNIFICANT CHANGE UP (ref 3.8–5.2)
RBC # FLD: 12.6 % — SIGNIFICANT CHANGE UP (ref 10.3–14.5)
S AUREUS DNA NOSE QL NAA+PROBE: SIGNIFICANT CHANGE UP
SARS-COV-2 RNA SPEC QL NAA+PROBE: SIGNIFICANT CHANGE UP
SODIUM SERPL-SCNC: 142 MMOL/L — SIGNIFICANT CHANGE UP (ref 135–145)
WBC # BLD: 6.13 K/UL — SIGNIFICANT CHANGE UP (ref 3.8–10.5)
WBC # FLD AUTO: 6.13 K/UL — SIGNIFICANT CHANGE UP (ref 3.8–10.5)

## 2022-02-14 PROCEDURE — 71046 X-RAY EXAM CHEST 2 VIEWS: CPT

## 2022-02-14 PROCEDURE — 93005 ELECTROCARDIOGRAM TRACING: CPT

## 2022-02-14 PROCEDURE — 93010 ELECTROCARDIOGRAM REPORT: CPT

## 2022-02-14 PROCEDURE — G0463: CPT

## 2022-02-14 NOTE — H&P PST ADULT - NSANTHOSAYNRD_GEN_A_CORE
No. SIDDHARTH screening performed.  STOP BANG Legend: 0-2 = LOW Risk; 3-4 = INTERMEDIATE Risk; 5-8 = HIGH Risk

## 2022-02-14 NOTE — H&P PST ADULT - NSICDXPASTMEDICALHX_GEN_ALL_CORE_FT
PAST MEDICAL HISTORY:  Cerebral aneurysm, nonruptured     Cervical ca LEEP procedure 1999    Multiple sclerosis     No pertinent past medical history

## 2022-02-14 NOTE — H&P PST ADULT - NSICDXFAMILYHX_GEN_ALL_CORE_FT
FAMILY HISTORY:  Father  Still living? No  FH: liver cancer, Age at diagnosis: Age Unknown    Mother  Still living? Yes, Estimated age: Age Unknown  Family history of diabetes mellitus (DM), Age at diagnosis: Age Unknown  FH: brain aneurysm, Age at diagnosis: Age Unknown  FH: breast cancer, Age at diagnosis: Age Unknown

## 2022-02-14 NOTE — H&P PST ADULT - NEGATIVE NEUROLOGICAL SYMPTOMS
no transient paralysis/no weakness/no paresthesias/no generalized seizures/no syncope/no vertigo/no loss of consciousness/no confusion

## 2022-02-14 NOTE — H&P PST ADULT - HISTORY OF PRESENT ILLNESS
56 year old right handed female who is originally a native of [place], but has lived here for three years. She has a history of relapsing and remitting multiple sclerosis. Past surgical history tonsillectomy and a procedure for cervical carcinoma. She has a family history of a mother who had a left posterior communicating artery aneurysm, coiled by Dr. Rangel Acevedo. 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 She is neurologically intact.    She was recently evaluated by [facility] Dr. Castorena due to incidental aneurysm.     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 Leonor Mahajan.   57 y/o female with PMH MS presents to [facility]. Patient states she moved to [place] from [place] 03/2019, was switching doctors to establish care in [facility], was sent for MRI and was found to have cerebral aneurysm. Patient has a history of relapsing and remitting multiple sclerosis. Patient has a family history of a mother who had a left posterior communicating artery aneurysm, coiled by Dr. Rangel Acevedo. 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 Denies any numbness, tingling, loss of sensation or confusion. Patient is scheduled for cerebral angiogram with moderate sedation on 2/17/2022 with Dr. Leonor Mahajan.

## 2022-02-14 NOTE — ASU PATIENT PROFILE, ADULT - BLOOD TRANSFUSION, PREVIOUS, PROFILE
no < from: 12 Lead ECG (08.26.21 @ 16:01) >      Ventricular Rate 80 BPM    Atrial Rate 80 BPM    P-R Interval 130 ms    QRS Duration 86 ms    Q-T Interval 380 ms    QTC Calculation(Bazett) 438 ms    P Axis 82 degrees    R Axis 88 degrees    T Axis 86 degrees    Diagnosis Line NORMAL SINUS RHYTHM  NORMAL ECG  WHEN COMPARED WITH ECG OF 27-JUL-2021 18:35,  NO SIGNIFICANT CHANGE WAS FOUND  Confirmed by MD ANTHONY, CHRISTOPHER ([id number]) on 8/27/2021 4:35:45 PM    < end of copied text >

## 2022-02-14 NOTE — H&P PST ADULT - RS GEN PE MLT RESP DETAILS PC
normal/airway patent/breath sounds equal/good air movement/clear to auscultation bilaterally/no rales/no rhonchi

## 2022-02-14 NOTE — H&P PST ADULT - PROBLEM SELECTOR PLAN 1
Patient is scheduled for cerebral angiogram with moderate sedation on 2/17/2022 with Dr. Leonor Mahajan

## 2022-02-14 NOTE — ASU PATIENT PROFILE, ADULT - FALL HARM RISK - UNIVERSAL INTERVENTIONS
Bed in lowest position, wheels locked, appropriate side rails in place/Call bell, personal items and telephone in reach/Instruct patient to call for assistance before getting out of bed or chair/Non-slip footwear when patient is out of bed/[place] to call system/Physically safe environment - no spills, clutter or unnecessary equipment/Purposeful Proactive Rounding/Room/bathroom lighting operational, light cord in reach

## 2022-02-14 NOTE — H&P PST ADULT - ASSESSMENT
Patient educated on surgical scrub, COVID testing, preadmission instructions, medical clearance and day of procedure medications, verbalizes understanding. Pt instructed to stop vitamins/supplements/herbal medications/ASA/NSAIDS for one week prior to surgery and discuss with PMD.      CAPRINI SCORE [CLOT]    AGE RELATED RISK FACTORS                                                       MOBILITY RELATED FACTORS  [ ] Age 41-60 years                                            (1 Point)                  [ ] Bed rest                                                        (1 Point)  [ ] Age: 61-74 years                                           (2 Points)                 [ ] Plaster cast                                                   (2 Points)  [ ] Age= 75 years                                              (3 Points)                 [ ] Bed bound for more than 72 hours                 (2 Points)    DISEASE RELATED RISK FACTORS                                               GENDER SPECIFIC FACTORS  [ ] Edema in the lower extremities                       (1 Point)                  [ ] Pregnancy                                                     (1 Point)  [ ] Varicose veins                                               (1 Point)                  [ ] Post-partum < 6 weeks                                   (1 Point)             [ ] BMI > 25 Kg/m2                                            (1 Point)                  [ ] Hormonal therapy  or oral contraception          (1 Point)                 [ ] Sepsis (in the previous month)                        (1 Point)                  [ ] History of pregnancy complications                 (1 point)  [ ] Pneumonia or serious lung disease                                               [ ] Unexplained or recurrent                     (1 Point)           (in the previous month)                               (1 Point)  [ ] Abnormal pulmonary function test                     (1 Point)                 SURGERY RELATED RISK FACTORS  [ ] Acute myocardial infarction                              (1 Point)                 [ ]  Section                                             (1 Point)  [ ] Congestive heart failure (in the previous month)  (1 Point)               [ ] Minor surgery                                                  (1 Point)   [ ] Inflammatory bowel disease                             (1 Point)                 [ ] Arthroscopic surgery                                        (2 Points)  [ ] Central venous access                                      (2 Points)                [ ] General surgery lasting more than 45 minutes   (2 Points)       [ ] Stroke (in the previous month)                          (5 Points)               [ ] Elective arthroplasty                                         (5 Points)                                                                                                                                               HEMATOLOGY RELATED FACTORS                                                 TRAUMA RELATED RISK FACTORS  [ ] Prior episodes of VTE                                     (3 Points)                [ ] Fracture of the hip, pelvis, or leg                       (5 Points)  [ ] Positive family history for VTE                         (3 Points)                 [ ] Acute spinal cord injury (in the previous month)  (5 Points)  [ ] Prothrombin 98815 A                                     (3 Points)                 [ ] Paralysis  (less than 1 month)                             (5 Points)  [ ] Factor V Leiden                                             (3 Points)                  [ ] Multiple Trauma within 1 month                        (5 Points)  [ ] Lupus anticoagulants                                     (3 Points)                                                           [ ] Anticardiolipin antibodies                               (3 Points)                                                       [ ] High homocysteine in the blood                      (3 Points)                                             [ ] Other congenital or acquired thrombophilia      (3 Points)                                                [ ] Heparin induced thrombocytopenia                  (3 Points)                                          Total Score [          ]    Caprini Score 0 - 2:  Low Risk, No VTE Prophylaxis required for most patients, encourage ambulation  Caprini Score 3 - 6:  At Risk, pharmacologic VTE prophylaxis is indicated for most patients (in the absence of a contraindication)  Caprini Score Greater than or = 7:  High Risk, pharmacologic VTE prophylaxis is indicated for most patients (in the absence of a contraindication)    OPIOID RISK TOOL    JAYLEN EACH BOX THAT APPLIES AND ADD TOTALS AT THE END    FAMILY HISTORY OF SUBSTANCE ABUSE                 FEMALE         MALE                                                Alcohol                             [  ]1 pt          [  ]3pts                                               Illegal Durgs                     [  ]2 pts        [  ]3pts                                               Rx Drugs                           [  ]4 pts        [  ]4 pts    PERSONAL HISTORY OF SUBSTANCE ABUSE                                                                                          Alcohol                             [  ]3 pts       [  ]3 pts                                               Illegal Drugs                     [  ]4 pts        [  ]4 pts                                               Rx Drugs                           [  ]5 pts        [  ]5 pts    AGE BETWEEN 16-45 YEARS                                      [  ]1 pt         [  ]1 pt    HISTORY OF PREADOLESCENT   SEXUAL ABUSE                                                             [  ]3 pts        [  ]0pts    PSYCHOLOGICAL DISEASE                     ADD, OCD, Bipolar, Schizophrenia        [  ]2 pts         [  ]2 pts                      Depression                                               [  ]1 pt           [  ]1 pt           SCORING TOTAL   (add numbers and type here)              (***)                                     A score of 3 or lower indicated LOW risk for future opioid abuse  A score of 4 to 7 indicated moderate risk for future opioid abuse  A score of 8 or higher indicates a high risk for opioid abuse      55 y/o female with PMH MS presents to [facility]. Patient states she moved to [place] from [place] 2019, was switching doctors to establish care in [facility], was sent for MRI and was found to have cerebral aneurysm. Patient has a history of relapsing and remitting multiple sclerosis. Patient has a family history of a mother who had a left posterior communicating artery aneurysm, coiled by Dr. Rangel Acevedo. 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 Denies any numbness, tingling, loss of sensation or confusion. Patient is scheduled for cerebral angiogram with moderate sedation on 2022 with Dr. Leonor Mahajan. Patient educated on surgical scrub, COVID testing done today 2022, preadmission instructions and day of procedure medications, verbalizes understanding. Pt instructed to stop vitamins/supplements/herbal medications/ASA/NSAIDS for one week prior to surgery and discuss with PMD.      CAPRINI SCORE [CLOT]    AGE RELATED RISK FACTORS                                                       MOBILITY RELATED FACTORS  [x ] Age 41-60 years                                            (1 Point)                  [ ] Bed rest                                                        (1 Point)  [ ] Age: 61-74 years                                           (2 Points)                 [ ] Plaster cast                                                   (2 Points)  [ ] Age= 75 years                                              (3 Points)                 [ ] Bed bound for more than 72 hours                 (2 Points)    DISEASE RELATED RISK FACTORS                                               GENDER SPECIFIC FACTORS  [ ] Edema in the lower extremities                       (1 Point)                  [ ] Pregnancy                                                     (1 Point)  [ ] Varicose veins                                               (1 Point)                  [ ] Post-partum < 6 weeks                                   (1 Point)             [x ] BMI > 25 Kg/m2                                            (1 Point)                  [ ] Hormonal therapy  or oral contraception          (1 Point)                 [ ] Sepsis (in the previous month)                        (1 Point)                  [ ] History of pregnancy complications                 (1 point)  [ ] Pneumonia or serious lung disease                                               [ ] Unexplained or recurrent                     (1 Point)           (in the previous month)                               (1 Point)  [ ] Abnormal pulmonary function test                     (1 Point)                 SURGERY RELATED RISK FACTORS  [ ] Acute myocardial infarction                              (1 Point)                 [ ]  Section                                             (1 Point)  [ ] Congestive heart failure (in the previous month)  (1 Point)               [ ] Minor surgery                                                  (1 Point)   [ ] Inflammatory bowel disease                             (1 Point)                 [ ] Arthroscopic surgery                                        (2 Points)  [ ] Central venous access                                      (2 Points)                [x ] General surgery lasting more than 45 minutes   (2 Points)       [ ] Stroke (in the previous month)                          (5 Points)               [ ] Elective arthroplasty                                         (5 Points)                                                                                                                                               HEMATOLOGY RELATED FACTORS                                                 TRAUMA RELATED RISK FACTORS  [ ] Prior episodes of VTE                                     (3 Points)                [ ] Fracture of the hip, pelvis, or leg                       (5 Points)  [ ] Positive family history for VTE                         (3 Points)                 [ ] Acute spinal cord injury (in the previous month)  (5 Points)  [ ] Prothrombin 43709 A                                     (3 Points)                 [ ] Paralysis  (less than 1 month)                             (5 Points)  [ ] Factor V Leiden                                             (3 Points)                  [ ] Multiple Trauma within 1 month                        (5 Points)  [ ] Lupus anticoagulants                                     (3 Points)                                                           [ ] Anticardiolipin antibodies                               (3 Points)                                                       [ ] High homocysteine in the blood                      (3 Points)                                             [ ] Other congenital or acquired thrombophilia      (3 Points)                                                [ ] Heparin induced thrombocytopenia                  (3 Points)                                          Total Score [   4    ]    Caprini Score 0 - 2:  Low Risk, No VTE Prophylaxis required for most patients, encourage ambulation  Caprini Score 3 - 6:  At Risk, pharmacologic VTE prophylaxis is indicated for most patients (in the absence of a contraindication)  Caprini Score Greater than or = 7:  High Risk, pharmacologic VTE prophylaxis is indicated for most patients (in the absence of a contraindication)    OPIOID RISK TOOL    JAYLEN EACH BOX THAT APPLIES AND ADD TOTALS AT THE END    FAMILY HISTORY OF SUBSTANCE ABUSE                 FEMALE         MALE                                                Alcohol                             [  ]1 pt          [  ]3pts                                               Illegal Durgs                     [  ]2 pts        [  ]3pts                                               Rx Drugs                           [  ]4 pts        [  ]4 pts    PERSONAL HISTORY OF SUBSTANCE ABUSE                                                                                          Alcohol                             [  ]3 pts       [  ]3 pts                                               Illegal Drugs                     [  ]4 pts        [  ]4 pts                                               Rx Drugs                           [  ]5 pts        [  ]5 pts    AGE BETWEEN 16-45 YEARS                                      [  ]1 pt         [  ]1 pt    HISTORY OF PREADOLESCENT   SEXUAL ABUSE                                                             [  ]3 pts        [  ]0pts    PSYCHOLOGICAL DISEASE                     ADD, OCD, Bipolar, Schizophrenia        [  ]2 pts         [  ]2 pts                      Depression                                               [  ]1 pt           [  ]1 pt           SCORING TOTAL   (add numbers and type here)              (0)                                     A score of 3 or lower indicated LOW risk for future opioid abuse  A score of 4 to 7 indicated moderate risk for future opioid abuse  A score of 8 or higher indicates a high risk for opioid abuse

## 2022-02-16 ENCOUNTER — RESULT REVIEW (OUTPATIENT)
Age: 57
End: 2022-02-16

## 2022-02-16 PROCEDURE — 71046 X-RAY EXAM CHEST 2 VIEWS: CPT | Mod: 26

## 2022-02-17 ENCOUNTER — APPOINTMENT (OUTPATIENT)
Dept: NEUROSURGERY | Facility: HOSPITAL | Age: 57
End: 2022-02-17
Payer: COMMERCIAL

## 2022-02-17 ENCOUNTER — TRANSCRIPTION ENCOUNTER (OUTPATIENT)
Age: 57
End: 2022-02-17

## 2022-02-17 ENCOUNTER — OUTPATIENT (OUTPATIENT)
Dept: OUTPATIENT SERVICES | Facility: HOSPITAL | Age: 57
LOS: 1 days | End: 2022-02-17
Payer: COMMERCIAL

## 2022-02-17 VITALS
HEIGHT: 61 IN | WEIGHT: 140.21 LBS | RESPIRATION RATE: 17 BRPM | TEMPERATURE: 99 F | SYSTOLIC BLOOD PRESSURE: 123 MMHG | HEART RATE: 56 BPM | OXYGEN SATURATION: 98 % | DIASTOLIC BLOOD PRESSURE: 59 MMHG

## 2022-02-17 VITALS
HEART RATE: 64 BPM | OXYGEN SATURATION: 98 % | SYSTOLIC BLOOD PRESSURE: 119 MMHG | DIASTOLIC BLOOD PRESSURE: 56 MMHG | RESPIRATION RATE: 17 BRPM

## 2022-02-17 DIAGNOSIS — I67.1 CEREBRAL ANEURYSM, NONRUPTURED: ICD-10-CM

## 2022-02-17 DIAGNOSIS — Z98.890 OTHER SPECIFIED POSTPROCEDURAL STATES: Chronic | ICD-10-CM

## 2022-02-17 LAB — ABO RH CONFIRMATION: SIGNIFICANT CHANGE UP

## 2022-02-17 PROCEDURE — 76377 3D RENDER W/INTRP POSTPROCES: CPT

## 2022-02-17 PROCEDURE — 36226 PLACE CATH VERTEBRAL ART: CPT | Mod: 50

## 2022-02-17 PROCEDURE — 76377 3D RENDER W/INTRP POSTPROCES: CPT | Mod: 26

## 2022-02-17 PROCEDURE — C1769: CPT

## 2022-02-17 PROCEDURE — 36227 PLACE CATH XTRNL CAROTID: CPT

## 2022-02-17 PROCEDURE — C1887: CPT

## 2022-02-17 PROCEDURE — 36226 PLACE CATH VERTEBRAL ART: CPT

## 2022-02-17 PROCEDURE — C1894: CPT

## 2022-02-17 PROCEDURE — 36224 PLACE CATH CAROTD ART: CPT | Mod: 50

## 2022-02-17 PROCEDURE — 36224 PLACE CATH CAROTD ART: CPT

## 2022-02-17 PROCEDURE — 36415 COLL VENOUS BLD VENIPUNCTURE: CPT

## 2022-02-17 RX ORDER — ACETAMINOPHEN 500 MG
650 TABLET ORAL EVERY 6 HOURS
Refills: 0 | Status: DISCONTINUED | OUTPATIENT
Start: 2022-02-17 | End: 2022-03-03

## 2022-02-17 RX ORDER — SODIUM CHLORIDE 9 MG/ML
1000 INJECTION INTRAMUSCULAR; INTRAVENOUS; SUBCUTANEOUS
Refills: 0 | Status: DISCONTINUED | OUTPATIENT
Start: 2022-02-17 | End: 2022-03-03

## 2022-02-17 RX ORDER — ONDANSETRON 8 MG/1
4 TABLET, FILM COATED ORAL EVERY 4 HOURS
Refills: 0 | Status: DISCONTINUED | OUTPATIENT
Start: 2022-02-17 | End: 2022-03-03

## 2022-02-17 NOTE — DISCHARGE NOTE NURSING/CASE MANAGEMENT/SOCIAL WORK - PATIENT PORTAL LINK FT
You can access the FollowMyHealth Patient Portal offered by [facility] by registering at the following website: [email or web address]. By joining [facility]’s FollowMyHealth portal, you will also be able to view your health information using other applications (apps) compatible with our system.

## 2022-02-17 NOTE — CHART NOTE - NSCHARTNOTEFT_GEN_A_CORE
Neurointerventional Surgery Post Procedure Note    Procedure: Selective Cerebral Angiography     Pre- Procedure Diagnosis: Cerebral Aneurysm   Post- Procedure Diagnosis: Cerebral Aneursym    : Dr. Erik MD  Physician Assistant: Marshall  Nurse: Dorothy Ibarra  Anesthesiologist: Dr Hughes                                           Radiology Tech: Westley    Sheath:  5 - Georgian Sheath    I/Os: estimated blood loss less than 10cc,  IV fluids 500   cc,   Urine out put   0  cc,   Contrast: Omnipaque 150cc    Vitals :/60   HR  73 Spo2 100 %    Preliminary Report:  Under  a  5 Georgian long sheath via the right groin under MAC sedation  via left vertebral artery,  left interntal carotid artery, left external carotid artey, right vertebral artery, right internal carotid artery,  right external carotid artery, a selective cerebral angiography  was perofrmed and reveals             2.8 x 2.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 ( Official note to follow).    Patient tolerated procedure well.  Patient remains hemodynamically stable, no change in neurological status compared to baseline.  Results were discussed with patient, patient's family and Neurosurgery.  Right groin sheath  was discontinued.   Hemostasis was obtained with approximately 21 minutes of manual compression.     No active bleeding, no hematoma, no ecchymosis.   Quick clot and safeguard balloon dressing applied at 9:45am  Patient transferred to recovery room in stable condition.

## 2022-02-17 NOTE — ASU DISCHARGE PLAN (ADULT/PEDIATRIC) - NS MD DC FALL RISK RISK
For information on Fall & Injury Prevention, visit: [email or web address] OR  [email or web address] OR  https://www.cdc.gov/steadi/patient.html

## 2022-02-17 NOTE — ASU DISCHARGE PLAN (ADULT/PEDIATRIC) - CALL YOUR DOCTOR IF YOU HAVE ANY OF THE FOLLOWING:
Signs of stroke (facial droop, difficulty with vision, difficulty with speech, weakness in arms or legs, decreased sensation).  Significant bleeding or bruising at surgical site./Bleeding that does not stop/Swelling that gets worse/Pain not relieved by Medications/Wound/Surgical Site with redness, or foul smelling discharge or pus

## 2022-02-17 NOTE — DISCHARGE NOTE NURSING/CASE MANAGEMENT/SOCIAL WORK - NSDCPEFALRISK_GEN_ALL_CORE
For information on Fall & Injury Prevention, visit: [email or web address] OR  [email or web address] OR  https://www.cdc.gov/steadi/patient.html

## 2022-02-17 NOTE — CHART NOTE - NSCHARTNOTEFT_GEN_A_CORE
Neurointerventional Surgery  Pre-Procedure Note       HPI:  57 y/o female with PMH MS presents to [facility]. Patient states [place] moved to [place] 03[place], was switching doctors to establish care in [facility], was sent for MRI and was found to have cerebral aneurysm. Patient has a history of relapsing and remitting multiple sclerosis. Patient has a family history of a mother who had a left posterior communicating artery aneurysm, coiled by Dr. Rangel Acevedo. 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 Denies any numbness, tingling, loss of sensation or confusion. Patient is scheduled for cerebral angiogram with moderate sedation on 2/17/2022 with Dr. Leonor Mahajan.     Allergies: penicillin (Anaphylaxis)      PMH/PSH:  PAST MEDICAL & SURGICAL HISTORY:  No pertinent past medical history    Multiple sclerosis    Cerebral aneurysm, nonruptured    Cervical ca  LEEP procedure 1999    H/O LEEP  1999        Social History:   Social History:    FAMILY HISTORY:  FH: brain aneurysm (Mother)    FH: liver cancer (Father)    Family history of diabetes mellitus (DM) (Mother)    FH: breast cancer (Mother)        Current Medications:   acetaminophen     Tablet .. 650 milliGRAM(s) Oral every 6 hours PRN  ondansetron Injectable 4 milliGRAM(s) IV Push every 4 hours PRN  sodium chloride 0.9%. 1000 milliLiter(s) IV Continuous <Continuous>      Physical Exam:  Constitutional: NAD    Neuro  * Mental Status:  GCS 15:  E(4), V(5), M(6).  Awake, alert, oriented to conversation.  * Cranial Nerves: Cnii-Cnxii grossly intact. PERRL, EOMI, tongue midline, no gaze deviation  * Motor: RUE 5/5, LUE 5/5, RLE 5/5, LLE 5/5  * Sensory: Sensation intact to light touch  * Reflexes: Not assessed  * Gait: Not assessed          NIH SS:  DATE: 2/17  TIME:7:40  1A: Level of consciousness (0-3): 0  1B: Questions (0-2): 0    1C: Commands (0-2): 0  2: Gaze (0-2): 0  3: Visual fields (0-3): 0  4: Facial palsy (0-3): 0  MOTOR:  5A: Left arm motor drift (0-4): 0  5B: Right arm motor drift (0-4): 0  6A: Left leg motor drift (0-4): 0  6B: Right leg motor drift (0-4): 0  7: Limb ataxia (0-2): 0  SENSORY:  8: Sensation (0-2): 0  SPEECH:  9: Language (0-3): 0  10: Dysarthria (0-2): 0  EXTINCTION:  11: Extinction/inattention (0-2): 0    TOTAL SCORE: 0    Labs:                       Blood Bank:         Assessment/Plan:   This is a 56F presents electively for cerebral angiogram for left superior 3.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     Procedure, goals, risks, benefits and alternatives  were discussed with patient and (patient's family).  All questions were answered.  Risks include but are not limited to stroke, vessel injury, hemorrhage, and or groin hematoma.  Patient demonstrates understanding  of all risks involved with this procedure and wishes to continue.   Appropriate  content was obtained from patient and consent is in the patient's chart. Neurointerventional Surgery  Pre-Procedure Note       HPI:  57 y/o female with PMH MS presents to [facility]. Patient states she moved to [place] from [place], was switching doctors to establish care in [facility], was sent for MRI and was found to have cerebral aneurysm. Patient has a history of relapsing and remitting multiple sclerosis. Patient has a family history of a mother who had a left posterior communicating artery aneurysm, coiled by Dr. Rangel Acevedo. 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     Allergies: penicillin (Anaphylaxis)      PMH/PSH:  PAST MEDICAL & SURGICAL HISTORY:  No pertinent past medical history    Multiple sclerosis    Cerebral aneurysm, nonruptured    Cervical ca  LEEP procedure 1999    H/O LEEP  1999        Social History:   Social History:    FAMILY HISTORY:  FH: brain aneurysm (Mother)    FH: liver cancer (Father)    Family history of diabetes mellitus (DM) (Mother)    FH: breast cancer (Mother)        Current Medications:   acetaminophen     Tablet .. 650 milliGRAM(s) Oral every 6 hours PRN  ondansetron Injectable 4 milliGRAM(s) IV Push every 4 hours PRN  sodium chloride 0.9%. 1000 milliLiter(s) IV Continuous <Continuous>      Physical Exam:  Constitutional: NAD    Neuro  * Mental Status:  GCS 15:  E(4), V(5), M(6).  Awake, alert, oriented to conversation.  * Cranial Nerves: Cnii-Cnxii grossly intact. PERRL, EOMI, tongue midline, no gaze deviation  * Motor: RUE 5/5, LUE 5/5, RLE 5/5, LLE 5/5  * Sensory: Sensation intact to light touch  * Reflexes: Not assessed  * Gait: Not assessed          NIH SS:  DATE: 2/17  TIME:7:40  1A: Level of consciousness (0-3): 0  1B: Questions (0-2): 0    1C: Commands (0-2): 0  2: Gaze (0-2): 0  3: Visual fields (0-3): 0  4: Facial palsy (0-3): 0  MOTOR:  5A: Left arm motor drift (0-4): 0  5B: Right arm motor drift (0-4): 0  6A: Left leg motor drift (0-4): 0  6B: Right leg motor drift (0-4): 0  7: Limb ataxia (0-2): 0  SENSORY:  8: Sensation (0-2): 0  SPEECH:  9: Language (0-3): 0  10: Dysarthria (0-2): 0  EXTINCTION:  11: Extinction/inattention (0-2): 0    TOTAL SCORE: 0    Labs:                       Blood Bank:         Assessment/Plan:   This is a 56F presents electively for cerebral angiogram for left superior 3.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     Procedure, goals, risks, benefits and alternatives  were discussed with patient and (patient's family).  All questions were answered.  Risks include but are not limited to stroke, vessel injury, hemorrhage, and or groin hematoma.  Patient demonstrates understanding  of all risks involved with this procedure and wishes to continue.   Appropriate  content was obtained from patient and consent is in the patient's chart. Neurointerventional Surgery  Pre-Procedure Note       HPI:  57 y/o female with PMH MS presents to [facility]. Patient states she moved to [place], [facility], was sent for MRI and was found to have cerebral aneurysm. Patient has a history of relapsing and remitting multiple sclerosis. Patient has a family history of a mother who had a left posterior communicating artery aneurysm, coiled by Dr. Rangel Acevedo. 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     Allergies: penicillin (Anaphylaxis)      PMH/PSH:  PAST MEDICAL & SURGICAL HISTORY:  No pertinent past medical history    Multiple sclerosis    Cerebral aneurysm, nonruptured    Cervical ca  LEEP procedure 1999    H/O LEEP  1999        Social History:   Social History:    FAMILY HISTORY:  FH: brain aneurysm (Mother)    FH: liver cancer (Father)    Family history of diabetes mellitus (DM) (Mother)    FH: breast cancer (Mother)        Current Medications:   acetaminophen     Tablet .. 650 milliGRAM(s) Oral every 6 hours PRN  ondansetron Injectable 4 milliGRAM(s) IV Push every 4 hours PRN  sodium chloride 0.9%. 1000 milliLiter(s) IV Continuous <Continuous>      Physical Exam:  Constitutional: NAD    Neuro  * Mental Status:  GCS 15:  E(4), V(5), M(6).  Awake, alert, oriented to conversation.  * Cranial Nerves: Cnii-Cnxii grossly intact. PERRL, EOMI, tongue midline, no gaze deviation  * Motor: RUE 5/5, LUE 5/5, RLE 4-/5, LLE 4/5  * Sensory: Sensation intact to light touch  * Reflexes: Not assessed  * Gait: Not assessed          NIH SS:  DATE: 2/17  TIME:7:40  1A: Level of consciousness (0-3): 0  1B: Questions (0-2): 0    1C: Commands (0-2): 0  2: Gaze (0-2): 0  3: Visual fields (0-3): 0  4: Facial palsy (0-3): 0  MOTOR:  5A: Left arm motor drift (0-4): 0  5B: Right arm motor drift (0-4): 0  6A: Left leg motor drift (0-4): 0  6B: Right leg motor drift (0-4): 0  7: Limb ataxia (0-2): 0  SENSORY:  8: Sensation (0-2): 0  SPEECH:  9: Language (0-3): 0  10: Dysarthria (0-2): 0  EXTINCTION:  11: Extinction/inattention (0-2): 0    TOTAL SCORE: 0    Labs:                       Blood Bank:         Assessment/Plan:   This is a 56F presents electively for cerebral angiogram for left superior 3.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     Procedure, goals, risks, benefits and alternatives  were discussed with patient and (patient's family).  All questions were answered.  Risks include but are not limited to stroke, vessel injury, hemorrhage, and or groin hematoma.  Patient demonstrates understanding  of all risks involved with this procedure and wishes to continue.   Appropriate  content was obtained from patient and consent is in the patient's chart.

## 2022-02-17 NOTE — ASU DISCHARGE PLAN (ADULT/PEDIATRIC) - CARE PROVIDER_API CALL
Leonor Bentley)  Neurology; Vascular Neurology  [address] [place] [id number]  Phone: [phone]  Fax: [phone]  Follow Up Time: 2 weeks

## 2022-02-18 PROBLEM — C53.9 MALIGNANT NEOPLASM OF CERVIX UTERI, UNSPECIFIED: Chronic | Status: ACTIVE | Noted: 2022-02-14

## 2022-02-18 PROBLEM — I67.1 CEREBRAL ANEURYSM, NONRUPTURED: Chronic | Status: ACTIVE | Noted: 2022-02-14

## 2022-02-18 PROBLEM — G35 MULTIPLE SCLEROSIS: Chronic | Status: ACTIVE | Noted: 2022-02-14

## 2022-02-22 ENCOUNTER — APPOINTMENT (OUTPATIENT)
Dept: NEUROSURGERY | Facility: CLINIC | Age: 57
End: 2022-02-22
Payer: COMMERCIAL

## 2022-02-22 VITALS
WEIGHT: 140 LBS | SYSTOLIC BLOOD PRESSURE: 119 MMHG | BODY MASS INDEX: 26.43 KG/M2 | OXYGEN SATURATION: 98 % | TEMPERATURE: 97.5 F | DIASTOLIC BLOOD PRESSURE: 76 MMHG | HEIGHT: 61 IN | HEART RATE: 72 BPM

## 2022-02-22 PROCEDURE — 99215 OFFICE O/P EST HI 40 MIN: CPT

## 2022-02-28 NOTE — HISTORY OF PRESENT ILLNESS
[FreeTextEntry1] : Ms. Loza presents for follow up visit- post diagnostic cerebral angiogram on 2/17/2022. \par She has a history of relapsing and remitting multiple sclerosis. Past surgical history tonsillectomy and a procedure for cervical carcinoma. \par She has a family history of a mother who had a left posterior communicating artery aneurysm, coiled by Dr. Rangel Acevedo. She is allergic to penicillin, and she is active and works in the [employer] [place] [employer] at [employer]. \par She is doing well from the post angiogram perspective.  She does not endorse any headaches, n/v or vision changes. \par She does not endorse any pain referable to groin site. No bruising. \par

## 2022-02-28 NOTE — DATA REVIEWED
[de-identified] : ACC: [id number] EXAM: NEURO IR PROCEDURE\par \par PROCEDURE DATE: 02/17/2022\par \par \par \par INTERPRETATION: Pre-procedure diagnosis: Incidental left superior hypophyseal aneurysm\par \par Post-procedure diagnosis: Left superior hypophyseal aneurysm\par \par Procedure: Cerebral angiography\par \par Interventionalist: Leonor Valencia MD\par \par Assistant: Georges Young\par \par Anesthesiologist: Vasile Cage MD\par \par Contrast: Omnipaque 240, 150 cc\par \par Radiation Dose: A: 12.9 min, 1265 mGy B: 8.9 min, 97.8 mGy Total: 21.8 min, 1363 mGy\par \par Indications: The patient is a 56 year old right handed female with past medical history of relapsing remitting multiple sclerosis. On her last MRI to evaluate the lesions, an MRA was also performed and a 3.6 mm superior hypophyseal aneurysm was identified. She has a family history of a mother who had a left posterior communicating artery aneurysm, coiled by Dr. Rangel Acevedo. She was recently evaluated by Dr. Castorena (neurosurgeon) who recommended evaluation with formal cerebral angiography.The patient now presents for cerebral angiography. The risks, benefits, alternatives, complications and personnel associated with the procedure was discussed with the patient in great detail. She requested that we proceed.\par \par Procedure: The patient was brought into the angiography suite and positioned on the table supine. Both femoral regions were prepped and draped in the standard sterile fashion. The skin overlying the right femoral artery was infiltrated with lidocaine and accessed using a micropuncture kit and modified Seldinger technique. A 5 [place]an long sheath was inserted. A 5 [place]an Cordis Vert diagnostic catheter over an angled Glidewire was navigated into the right internal and external carotid artery and angiography of the intracranial and extracranial circulation was performed. A rotational angiogram was performed of the right internal carotid circulation. Three-dimensional images were evaluated and interpreted by me on an independent [employer] DXWP workstation. The catheter was then navigated into the right vertebral artery and angiography of the intracranial circulation was performed. The catheter was then navigated into the left internal and external carotid artery and angiography of the intracranial and extracranial circulation was performed. A rotational angiogram was performed of the left internal carotid circulation. Three-dimensional images were evaluated and interpreted by me on an independent [employer] XWP workstation. Lastly the catheter was navigated into the left vertebral artery and angiography of the intracranial circulation was performed.\par \par All catheters and guidewires were removed and hemostasis was obtained with manual compression, Quickclot and the Safeguard dressing\par \par Findings:\par \par Right internal and external carotid artery including 3D interpretation: There is normal transit of contrast through the arterial, capillary and venous phases. There is\par brief filling across the anterior communicating artery. A posterior communicating artery infundibulum is identified. There is no evidence of intracranial aneurysm, arteriovenous malformation or arteriovenous shunting. Multiple superficial cortical veins are identified. The superior sagittal sinus drains into the right transverse sinus, the occipital sinus and sigmoid sinuses. The vein of Frannie is identified. The territory of the vein Elian is drained by temporal veins that end in Frannie. The internal cerebral vein drains into the great vein of Sourav and the straight sinus. The middle cerebral vein drains into the cavernous, inferior petrosal sinus and pterygoid venous plexus. There is normal transit of contrast through the right external carotid circulation without evidence of arteriovenous shunting or dural fistula.\par \par Right vertebral artery: There is normal transit of contrast through the arterial, capillary and venous phases. There is no reflux into the contralateral vertebral artery. The basilar artery terminates in both posterior cerebral arteries. The posterior communicating arteries were briefly opacified. There is no evidence of intracranial aneurysm, arteriovenous malformation or arteriovenous shunting. The posterior circulation drains into the straight sinus and both transverse, occipital and sigmoid sinuses.\par \par Left internal and external carotid artery including 3D interpretation: There is normal transit of contrast through the arterial, capillary and venous phases. There is\par brief filling across the anterior communicating artery. A posterior communicating artery infundibulum is identified. A 2.8mm X 2.6mm left superior hypophyseal aneurysm is identified. There is no evidence of other intracranial aneurysm, arteriovenous malformation or arteriovenous shunting. Multiple superficial cortical veins are identified. The superior sagittal sinus drains into the right transverse sinus, the occipital sinus and sigmoid sinuses. The vein of Frannie is identified. The territory of the vein Elian is drained by temporal veins that end in Frannie. The internal cerebral vein drains into the great vein of Sourav and the straight sinus. The middle cerebral vein drains into the cavernous, inferior petrosal sinus and pterygoid venous plexus. There is normal transit of contrast through the left external carotid circulation without evidence of arteriovenous shunting or dural fistula.\par \par Left vertebral artery: There is normal transit of contrast through the arterial, capillary and venous phases. There is no reflux into the contralateral vertebral artery. The basilar artery terminates in both posterior cerebral arteries. The posterior communicating arteries were briefly opacified. There is no evidence of intracranial aneurysm, arteriovenous malformation or arteriovenous shunting. The posterior circulation drains into the straight sinus and both transverse, occipital and sigmoid sinuses.\par \par Impression: Left superior hypophyseal aneurysm\par

## 2022-02-28 NOTE — ASSESSMENT
[FreeTextEntry1] : Impression: Unruptured left superior hypophyseal artery aneurysm \par \par Patient would make an appropriate interventional radiology candidate for pipeline stenting of her left supraclinoid ICA aneurysm.\par Patient will be scheduled on 3/17/2022\par Start Plavix 75 mg and aspirin  81 mg daily on 3/3/2022.\par PST/COVID\par Patient has been given an opportunity to ask and have their questions answered to their satisfaction.\par Patient knows to call the office if there are any new or worsening symptoms.\par \par \par We discussed in great detail that cerebral angiogram with pipeline stenting  is a minimally invasive procedure to treat her unruptured left supraclinoid ICA aneurysm.  Risks and benefits discussed. Patient verbalizes understanding of the plan.  She has been given an opportunity to ask and have her questions answered.  She wishes to proceed with said procedure.\par \par I have seen and examined the patient along with my nurse practitioner, Jennifer Cash .  I have personally determined the assessment and plan of care based on today's encounter.\par

## 2022-03-14 ENCOUNTER — OUTPATIENT (OUTPATIENT)
Dept: OUTPATIENT SERVICES | Facility: HOSPITAL | Age: 57
LOS: 1 days | End: 2022-03-14
Payer: COMMERCIAL

## 2022-03-14 VITALS
RESPIRATION RATE: 18 BRPM | SYSTOLIC BLOOD PRESSURE: 113 MMHG | TEMPERATURE: 98 F | HEART RATE: 75 BPM | HEIGHT: 61 IN | DIASTOLIC BLOOD PRESSURE: 67 MMHG | OXYGEN SATURATION: 98 % | WEIGHT: 147.05 LBS

## 2022-03-14 DIAGNOSIS — I67.1 CEREBRAL ANEURYSM, NONRUPTURED: ICD-10-CM

## 2022-03-14 DIAGNOSIS — Z98.890 OTHER SPECIFIED POSTPROCEDURAL STATES: Chronic | ICD-10-CM

## 2022-03-14 DIAGNOSIS — Z29.9 ENCOUNTER FOR PROPHYLACTIC MEASURES, UNSPECIFIED: ICD-10-CM

## 2022-03-14 DIAGNOSIS — Z01.818 ENCOUNTER FOR OTHER PREPROCEDURAL EXAMINATION: ICD-10-CM

## 2022-03-14 DIAGNOSIS — G35 MULTIPLE SCLEROSIS: ICD-10-CM

## 2022-03-14 LAB
A1C WITH ESTIMATED AVERAGE GLUCOSE RESULT: 5.8 % — HIGH (ref 4–5.6)
ALBUMIN SERPL ELPH-MCNC: 4.2 G/DL — SIGNIFICANT CHANGE UP (ref 3.3–5.2)
ALP SERPL-CCNC: 93 U/L — SIGNIFICANT CHANGE UP (ref 40–120)
ALT FLD-CCNC: 26 U/L — SIGNIFICANT CHANGE UP
ANION GAP SERPL CALC-SCNC: 13 MMOL/L — SIGNIFICANT CHANGE UP (ref 5–17)
APTT BLD: 29.2 SEC — SIGNIFICANT CHANGE UP (ref 27.5–35.5)
AST SERPL-CCNC: 20 U/L — SIGNIFICANT CHANGE UP
BASOPHILS # BLD AUTO: 0.03 K/UL — SIGNIFICANT CHANGE UP (ref 0–0.2)
BASOPHILS NFR BLD AUTO: 0.5 % — SIGNIFICANT CHANGE UP (ref 0–2)
BILIRUB SERPL-MCNC: 0.2 MG/DL — LOW (ref 0.4–2)
BLD GP AB SCN SERPL QL: SIGNIFICANT CHANGE UP
BUN SERPL-MCNC: 14.3 MG/DL — SIGNIFICANT CHANGE UP (ref 8–20)
CALCIUM SERPL-MCNC: 9.4 MG/DL — SIGNIFICANT CHANGE UP (ref 8.6–10.2)
CHLORIDE SERPL-SCNC: 106 MMOL/L — SIGNIFICANT CHANGE UP (ref 98–107)
CO2 SERPL-SCNC: 23 MMOL/L — SIGNIFICANT CHANGE UP (ref 22–29)
CREAT SERPL-MCNC: 0.78 MG/DL — SIGNIFICANT CHANGE UP (ref 0.5–1.3)
EGFR: 89 ML/MIN/1.73M2 — SIGNIFICANT CHANGE UP
EOSINOPHIL # BLD AUTO: 0.1 K/UL — SIGNIFICANT CHANGE UP (ref 0–0.5)
EOSINOPHIL NFR BLD AUTO: 1.7 % — SIGNIFICANT CHANGE UP (ref 0–6)
ESTIMATED AVERAGE GLUCOSE: 120 MG/DL — HIGH (ref 68–114)
GLUCOSE SERPL-MCNC: 122 MG/DL — HIGH (ref 70–99)
HCT VFR BLD CALC: 39.7 % — SIGNIFICANT CHANGE UP (ref 34.5–45)
HGB BLD-MCNC: 13.1 G/DL — SIGNIFICANT CHANGE UP (ref 11.5–15.5)
IMM GRANULOCYTES NFR BLD AUTO: 0.3 % — SIGNIFICANT CHANGE UP (ref 0–1.5)
INR BLD: 1.03 RATIO — SIGNIFICANT CHANGE UP (ref 0.88–1.16)
LYMPHOCYTES # BLD AUTO: 1.51 K/UL — SIGNIFICANT CHANGE UP (ref 1–3.3)
LYMPHOCYTES # BLD AUTO: 26.4 % — SIGNIFICANT CHANGE UP (ref 13–44)
MCHC RBC-ENTMCNC: 29.5 PG — SIGNIFICANT CHANGE UP (ref 27–34)
MCHC RBC-ENTMCNC: 33 GM/DL — SIGNIFICANT CHANGE UP (ref 32–36)
MCV RBC AUTO: 89.4 FL — SIGNIFICANT CHANGE UP (ref 80–100)
MONOCYTES # BLD AUTO: 0.36 K/UL — SIGNIFICANT CHANGE UP (ref 0–0.9)
MONOCYTES NFR BLD AUTO: 6.3 % — SIGNIFICANT CHANGE UP (ref 2–14)
MRSA PCR RESULT.: SIGNIFICANT CHANGE UP
NEUTROPHILS # BLD AUTO: 3.7 K/UL — SIGNIFICANT CHANGE UP (ref 1.8–7.4)
NEUTROPHILS NFR BLD AUTO: 64.8 % — SIGNIFICANT CHANGE UP (ref 43–77)
PLATELET # BLD AUTO: 278 K/UL — SIGNIFICANT CHANGE UP (ref 150–400)
POTASSIUM SERPL-MCNC: 4.5 MMOL/L — SIGNIFICANT CHANGE UP (ref 3.5–5.3)
POTASSIUM SERPL-SCNC: 4.5 MMOL/L — SIGNIFICANT CHANGE UP (ref 3.5–5.3)
PROT SERPL-MCNC: 7.1 G/DL — SIGNIFICANT CHANGE UP (ref 6.6–8.7)
PROTHROM AB SERPL-ACNC: 12 SEC — SIGNIFICANT CHANGE UP (ref 10.5–13.4)
RBC # BLD: 4.44 M/UL — SIGNIFICANT CHANGE UP (ref 3.8–5.2)
RBC # FLD: 12.7 % — SIGNIFICANT CHANGE UP (ref 10.3–14.5)
S AUREUS DNA NOSE QL NAA+PROBE: SIGNIFICANT CHANGE UP
SODIUM SERPL-SCNC: 142 MMOL/L — SIGNIFICANT CHANGE UP (ref 135–145)
WBC # BLD: 5.72 K/UL — SIGNIFICANT CHANGE UP (ref 3.8–10.5)
WBC # FLD AUTO: 5.72 K/UL — SIGNIFICANT CHANGE UP (ref 3.8–10.5)

## 2022-03-14 PROCEDURE — 71046 X-RAY EXAM CHEST 2 VIEWS: CPT | Mod: 26

## 2022-03-14 RX ORDER — SODIUM CHLORIDE 9 MG/ML
3 INJECTION INTRAMUSCULAR; INTRAVENOUS; SUBCUTANEOUS EVERY 8 HOURS
Refills: 0 | Status: DISCONTINUED | OUTPATIENT
Start: 2022-03-17 | End: 2022-03-18

## 2022-03-14 RX ORDER — VANCOMYCIN HCL 1 G
1000 VIAL (EA) INTRAVENOUS ONCE
Refills: 0 | Status: DISCONTINUED | OUTPATIENT
Start: 2022-03-17 | End: 2022-03-18

## 2022-03-14 NOTE — H&P PST ADULT - HISTORY OF PRESENT ILLNESS
57 y/o female with PMH MS presents to [facility]. Patient states [place] to [place], was switching doctors to establish care [facility], was sent for MRI and was found to have cerebral aneurysm. Patient has a history of relapsing and remitting multiple sclerosis. Patient has a family history of a mother who had a left posterior communicating artery aneurysm, coiled by Dr. Rangel Acevedo. 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 Denies any numbness, tingling, loss of sensation or confusion. Patient is scheduled for cerebral angiogram with moderate sedation on 2/17/2022 with Dr. Leonor Mahajan.     s/p cerebral angiogram  Patient is scheduled for cerebral angiogram embolization with pipeline stent with Dr Erik Watkins 3/17/22  Medical clearance pending  56 year old female with PMH MS, cerebral aneurysm found on MRI when she moved from [place] to [place] and was establishing care, she has family history of a mother who had a left posterior communicating artery aneurysm, coiled by Dr. Rangel Acevedo. MRI performed in 01/2022 showed stable, extensive, confluent, T2 FLAIR hyperintense signal within the white matter, consistent with MS, and the MRA shows a possible 3.6 mm superior hypophyseal aneurysm. Patient is s/p cerebral angiogram with moderate sedation on 2/17/2022 with Dr. Leonor Mahajan which confirmed unruptured left superior hypophyseal artery aneurysm. Today at [facility] patient states her MS is well controlled with Avenox injections, she denies any visual changes, dizziness, lightheadedness, loss of sensation, difficulty with speech or trouble walking.  Patient is now scheduled for cerebral angiogram embolization with pipeline stent with Dr Erik Watkins 3/17/22.  Medical clearance pending

## 2022-03-14 NOTE — H&P PST ADULT - PROBLEM SELECTOR PLAN 1
Patient is now scheduled for cerebral angiogram embolization with pipeline stent with Dr Erik Watkins 3/17/22. Medical clearance is pending

## 2022-03-14 NOTE — ASU PATIENT PROFILE, ADULT - AS SC BRADEN ACTIVITY
ROMAN MCCABE  : 1941  ACCOUNT:  [id number]  [phone]  PCP: Dr. Jered Bansal     TODAY'S DATE: 2018  DICTATED BY:  [Julie Frommelt, APN]      CHIEF COMPLAINT: [Followup of CAD, of native vessels, Followup of Carotid artery stenosis, bilateral, Followup of Edema and generalized.]    HPI:    [On 2018, Roman Mccabe, a 77 year old male, presented with edema.]    This is an urgent/same day appointment to evaluate lower extremity edema.  Patient is present with his daughter.  She reports increased lower extremity edema which has been present for several months.  He went to [place] in February and returned in .  Otherwise he has not reported any travel.  His edema pre-dated his long distance travel.    He denies any chest pain or shortness of breath.  He does not have dizziness, lightheadedness, syncope, or near syncope.  His edema improves overnight.    In review of his medical record, his amlodipine was previously reduced to 5 mg.  Currently he is taking 10 mg again.  Losartan was previously recommended for hypertension which his daughter did not want to start as he was leaving the country.    His last echocardiogram a year ago demonstrated EF 60-65% with grade 1 diastolic dysfunction.  He had trivial aortic and mitral regurg.  On exam his murmur of both aortic and mitral regurg are significant.    RISK FACTORS:CAD Equivalent - Carotid Artery Disease    REVIEW OF SYSTEMS:    CONS: no fever, chills, or recent weight changes. EYES: denies significant visual changes. ENMT: denies difficulties with hearing, otherwise negative. CV: peripheral edema, see CV exam. RESP: denies chronic cough, hemoptysis. GI: denies melena, hematochezia. : no hematuria. INTEG: no new rashes, lesions. MS: pain in upper legs upon waking. NEURO: occasional lightheaded,dizziness. HEM/LYMPH: denies easy bruising. ALL: no new food or enviornmental allergies.      PAST HISTORY: Type 2 DM, Anemia, Vit D def.,transient  afib during cath: amio iv and dc cv in lab.    PAST CV HISTORY: 2015 aa nl but with plaque, atrial fibrillation, dyslipidemia, hypertension; abnl ufct 2015 with score 1456, laseratherectomy and kissing balloons for isr of OM1 2016- no new stents placed at that time. native rca 90%;  lad with 50-60% with neg ffr; : keny ramus and keny rca and PTCA and stent placement in the circumflex with PROMUS drug-eluting stent 11/15/15 in both om 1 and om 2.  ivus of lad ok.    FAMILY HISTORY: Negative for premature CAD. Negative for AAA.  SOCIAL HISTORY: SMOKING: Never used tobacco. denies smoking. CAFFEINE: occasional. ALCOHOL: denies drinking. EXERCISE: no regular exercise. DIET: no special diet. MARITAL STATUS: .     ALLERGIES: No Known Allergies    MEDICATIONS: Selected prescriptions see below    VITAL SIGNS: [B/P - 142/66 , Pulse - 53, Weight -  176, Height -   66 , BMI - 28.4 ]    CONS: well developed, well nourished. WEIGHT: BMI parameters reviewed and discussed. HEAD/FACE: no trauma and normocephalic. EYES: conjunctivae not injected and no xanthelasma. ENT: mucosa pink and moist. NECK: jugular venous pressure not elevated. RESP: respirations with normal rate and rhythm, clear to auscultation. GI: no masses, tenderness or hepatosplenomegaly, rectal deferred. MS: adequate gait for exercise/testing. EXT: edema bilaterally.  SKIN: no rashes, lesions, ulcers.  NEURO/PSYCH: alert and oriented to time, place and person and normal affect.      CV: PALP: PMI not palpable. AUSC:  regular rhythm, normal S1, S2 without S3; 2/6 systolic murmurs. CAROTIDS: carotid pulses normal. PEDAL: pedal pulses intact. EXT: 1+ pedal/ankle edema bilaterally.     DECISION MAKIN.  Edema, I suspect this is secondary to high-dose amlodipine.  I recommended reduce amlodipine to 5 mg daily.    2.  Murmur, repeat echocardiogram  3.  Atrial fibrillation, currently maintaining sinus rhythm on amiodarone and Xarelto  4.   Hypertension, begin losartan 50 mg daily    ASSESSMENT:  1. Atherosclerosis, aorta  2. Atrial fibrillation, chronic  3. CAD, of native vessels  4. Carotid artery stenosis, bilateral  5. Coumadin Management,S  6. Edema, generalized  7. History of drug eluting stent, two long promus stents om1 and om2  8. Hypercholesteremia, pure      PLAN:  [1.  Reduce amlodipine to 5 mg daily  2.  Begin losartan 50 mg daily  3.  Perform echocardiogram  4.  Call for new or worsening symptoms  5.  Follow-up with Dr. Kebede 6 weeks  ]    PRESCRIPTIONS:   08/23/18 *AmLODIPine Besylate  5MG       1 TABLET DAILY.                          08/23/18 *Losartan Potassium   50MG      1 TABLET DAILY AS DIRECTED.              08/23/18 *Xarelto              15MG      1 TABLET BY MOUTH DAILY                  08/21/18 *Klor-Con M10         10MEQ     ONE TABLET BY MOUTH ONCE DAILY           03/12/18 *Amiodarone HCl       200MG     ONE TABLET BY MOUTH WITH MEALS.          02/08/18 *Furosemide           20MG      1 TABLET DAILY                           02/08/18 *Nitrostat            0.4MG     PLACE 1 TAB UNDER TONGUE EVERY 5 MIN     12/26/17 *Metoprolol Succinate 25MG      ONE TABLET BY MOUTH ONCE DAILY           12/07/17 *Atorvastatin Calcium 40MG      ONE TABLET BY MOUTH ONCE DAILY AT BE     11/27/17 *Clopidogrel [id number]      ONE TABLET BY MOUTH ONCE DAILY           08/23/18 GlipiZIDE             5MG       1/2 tab daily                            12/07/17 MetFORMIN HCl         1000MG    1 tablet by mouth twice daily                     (4) walks frequently

## 2022-03-14 NOTE — H&P PST ADULT - NSICDXPASTMEDICALHX_GEN_ALL_CORE_FT
PAST MEDICAL HISTORY:  Cerebral aneurysm, nonruptured     Cervical ca LEEP procedure 1999    Multiple sclerosis

## 2022-03-14 NOTE — H&P PST ADULT - IS PATIENT PREGNANT?
"[facility] message sent:  \"Ms. Sweats,    Hemoglobin is reduced and we will discuss this at your upcoming appointment.  Other labs are okay.    Sincerely,  Nico Byers MD  8/24/2020 5:55 PM\"" no

## 2022-03-14 NOTE — H&P PST ADULT - ASSESSMENT
CAPRINI SCORE    AGE RELATED RISK FACTORS                                                             [ ] Age 41-60 years                                            (1 Point)  [ ] Age: 61-74 years                                           (2 Points)                 [ ] Age= 75 years                                                (3 Points)             DISEASE RELATED RISK FACTORS                                                       [ ] Edema in the lower extremities                 (1 Point)                     [ ] Varicose veins                                               (1 Point)                                 [ ] BMI > 25 Kg/m2                                            (1 Point)                                  [ ] Serious infection (ie PNA)                            (1 Point)                     [ ] Lung disease ( COPD, Emphysema)            (1 Point)                                                                          [ ] Acute myocardial infarction                         (1 Point)                  [ ] Congestive heart failure (in the previous month)  (1 Point)         [ ] Inflammatory bowel disease                            (1 Point)                  [ ] Central venous access, PICC or Port               (2 points)       (within the last month)                                                                [ ] Stroke (in the previous month)                        (5 Points)    [ ] Previous or present malignancy                       (2 points)                                                                                                                                                         HEMATOLOGY RELATED FACTORS                                                         [ ] Prior episodes of VTE                                     (3 Points)                     [ ] Positive family history for VTE                      (3 Points)                  [ ] Prothrombin 64134 A                                     (3 Points)                     [ ] Factor V Leiden                                                (3 Points)                        [ ] Lupus anticoagulants                                      (3 Points)                                                           [ ] Anticardiolipin antibodies                              (3 Points)                                                       [ ] High homocysteine in the blood                   (3 Points)                                             [ ] Other congenital or acquired thrombophilia      (3 Points)                                                [ ] Heparin induced thrombocytopenia                  (3 Points)                                        MOBILITY RELATED FACTORS  [ ] Bed rest                                                         (1 Point)  [ ] Plaster cast                                                    (2 points)  [ ] Bed bound for more than 72 hours           (2 Points)    GENDER SPECIFIC FACTORS  [ ] Pregnancy or had a baby within the last month   (1 Point)  [ ] Post-partum < 6 weeks                                   (1 Point)  [ ] Hormonal therapy  or oral contraception   (1 Point)  [ ] History of pregnancy complications              (1 point)  [ ] Unexplained or recurrent              (1 Point)    OTHER RISK FACTORS                                           (1 Point)  [ ] BMI >40, smoking, diabetes requiring insulin, chemotherapy  blood transfusions and length of surgery over 2 hours    SURGERY RELATED RISK FACTORS  [ ]  Section within the last month     (1 Point)  [ ] Minor surgery                                                  (1 Point)  [ ] Arthroscopic surgery                                       (2 Points)  [ ] Planned major surgery lasting more            (2 Points)      than 45 minutes     [ ] Elective hip or knee joint replacement       (5 points)       surgery                                                TRAUMA RELATED RISK FACTORS  [ ] Fracture of the hip, pelvis, or leg                       (5 Points)  [ ] Spinal cord injury resulting in paralysis             (5 points)       (in the previous month)    [ ] Paralysis  (less than 1 month)                             (5 Points)  [ ] Multiple Trauma within 1 month                        (5 Points)    Total Score [        ]    Caprini Score 0-2: Low Risk, NO VTE prophylaxis required for most patients, encourage ambulation  Caprini Score 3-6: Moderate Risk , pharmacologic VTE prophylaxis is indicated for most patients (in the absence of contraindications)  Caprini Score Greater than or =7: High risk, pharmocologic VTE prophylaxis indicated for most patients (in the absence of contraindications)              OPIOID RISK TOOL    JAYLEN EACH BOX THAT APPLIES AND ADD TOTALS AT THE END    FAMILY HISTORY OF SUBSTANCE ABUSE                 FEMALE         MALE                                                Alcohol                             [  ]1 pt          [  ]3pts                                               Illegal Durgs                     [  ]2 pts        [  ]3pts                                               Rx Drugs                           [  ]4 pts        [  ]4 pts    PERSONAL HISTORY OF SUBSTANCE ABUSE                                                                                          Alcohol                             [  ]3 pts       [  ]3 pts                                               Illegal Durgs                     [  ]4 pts        [  ]4 pts                                               Rx Drugs                           [  ]5 pts        [  ]5 pts    AGE BETWEEN 16-45 YEARS                                      [  ]1 pt         [  ]1 pt    HISTORY OF PREADOLESCENT   SEXUAL ABUSE                                                             [  ]3 pts        [  ]0pts    PSYCHOLOGICAL DISEASE                     ADD, OCD, Bipolar, Schizophrenia        [  ]2 pts         [  ]2 pts                      Depression                                               [  ]1 pt           [  ]1 pt           SCORING TOTAL     A score of 3 or lower indicated LOW risk for future opiod abuse  A score of 4 to 7 indicated moderate risk for future opiod abuse  A score of 8 or higher indicates a high risk for opiod abuse CAPRINI SCORE    AGE RELATED RISK FACTORS                                                             [x ] Age 41-60 years                                            (1 Point)  [ ] Age: 61-74 years                                           (2 Points)                 [ ] Age= 75 years                                                (3 Points)             DISEASE RELATED RISK FACTORS                                                       [ ] Edema in the lower extremities                 (1 Point)                     [ ] Varicose veins                                               (1 Point)                                 [x ] BMI > 25 Kg/m2                                            (1 Point)                                  [ ] Serious infection (ie PNA)                            (1 Point)                     [ ] Lung disease ( COPD, Emphysema)            (1 Point)                                                                          [ ] Acute myocardial infarction                         (1 Point)                  [ ] Congestive heart failure (in the previous month)  (1 Point)         [ ] Inflammatory bowel disease                            (1 Point)                  [ ] Central venous access, PICC or Port               (2 points)       (within the last month)                                                                [ ] Stroke (in the previous month)                        (5 Points)    [x ] Previous or present malignancy                       (2 points)                                                                                                                                                         HEMATOLOGY RELATED FACTORS                                                         [ ] Prior episodes of VTE                                     (3 Points)                     [ ] Positive family history for VTE                      (3 Points)                  [ ] Prothrombin 58392 A                                     (3 Points)                     [ ] Factor V Leiden                                                (3 Points)                        [ ] Lupus anticoagulants                                      (3 Points)                                                           [ ] Anticardiolipin antibodies                              (3 Points)                                                       [ ] High homocysteine in the blood                   (3 Points)                                             [ ] Other congenital or acquired thrombophilia      (3 Points)                                                [ ] Heparin induced thrombocytopenia                  (3 Points)                                        MOBILITY RELATED FACTORS  [ ] Bed rest                                                         (1 Point)  [ ] Plaster cast                                                    (2 points)  [ ] Bed bound for more than 72 hours           (2 Points)    GENDER SPECIFIC FACTORS  [ ] Pregnancy or had a baby within the last month   (1 Point)  [ ] Post-partum < 6 weeks                                   (1 Point)  [ ] Hormonal therapy  or oral contraception   (1 Point)  [ ] History of pregnancy complications              (1 point)  [ ] Unexplained or recurrent              (1 Point)    OTHER RISK FACTORS                                           (1 Point)  [x ] BMI >40, smoking, diabetes requiring insulin, chemotherapy  blood transfusions and length of surgery over 2 hours    SURGERY RELATED RISK FACTORS  [ ]  Section within the last month     (1 Point)  [ ] Minor surgery                                                  (1 Point)  [ ] Arthroscopic surgery                                       (2 Points)  [x ] Planned major surgery lasting more            (2 Points)      than 45 minutes     [ ] Elective hip or knee joint replacement       (5 points)       surgery                                                TRAUMA RELATED RISK FACTORS  [ ] Fracture of the hip, pelvis, or leg                       (5 Points)  [ ] Spinal cord injury resulting in paralysis             (5 points)       (in the previous month)    [ ] Paralysis  (less than 1 month)                             (5 Points)  [ ] Multiple Trauma within 1 month                        (5 Points)    Total Score [    6    ]    Caprini Score 0 - 2:  Low Risk, No VTE Prophylaxis required for most patients, encourage ambulation  Caprini Score 3 - 6:  At Risk, pharmacologic VTE prophylaxis is indicated for most patients (in the absence of a contraindication)  Caprini Score Greater than or = 7:  High Risk, pharmacologic VTE prophylaxis is indicated for most patients (in the absence of a contraindication)    OPIOID RISK TOOL    JAYLEN EACH BOX THAT APPLIES AND ADD TOTALS AT THE END    FAMILY HISTORY OF SUBSTANCE ABUSE                 FEMALE         MALE                                                Alcohol                             [  ]1 pt          [  ]3pts                                               Illegal Durgs                     [  ]2 pts        [  ]3pts                                               Rx Drugs                           [  ]4 pts        [  ]4 pts    PERSONAL HISTORY OF SUBSTANCE ABUSE                                                                                          Alcohol                             [  ]3 pts       [  ]3 pts                                               Illegal Drugs                     [  ]4 pts        [  ]4 pts                                               Rx Drugs                           [  ]5 pts        [  ]5 pts    AGE BETWEEN 16-45 YEARS                                      [  ]1 pt         [  ]1 pt    HISTORY OF PREADOLESCENT   SEXUAL ABUSE                                                             [  ]3 pts        [  ]0pts    PSYCHOLOGICAL DISEASE                     ADD, OCD, Bipolar, Schizophrenia        [  ]2 pts         [  ]2 pts                      Depression                                               [  ]1 pt           [  ]1 pt           SCORING TOTAL   (add numbers and type here)              (0)                                     A score of 3 or lower indicated LOW risk for future opioid abuse  A score of 4 to 7 indicated moderate risk for future opioid abuse  A score of 8 or higher indicates a high risk for opioid abuse    56 year old female with PMH MS, cerebral aneurysm found on MRI when she moved from [place] to [place] and was establishing care, she has family history of a mother who had a left posterior communicating artery aneurysm, coiled by Dr. Rangel Acevedo. MRI performed in 2022 showed stable, extensive, confluent, T2 FLAIR hyperintense signal within the white matter, consistent with MS, and the MRA shows a possible 3.6 mm superior hypophyseal aneurysm. Patient is s/p cerebral angiogram with moderate sedation on 2022 with Dr. Leonor Mahajan which confirmed unruptured left superior hypophyseal artery aneurysm. Today at [facility] patient states her MS is well controlled with Avenox injections.  CN I-XII intact, A&O x3, strength intact, sensation in tact, gait is steady no abnormalities noted, Patient is now scheduled for cerebral angiogram embolization with pipeline stent with Dr Erik Watkins 3/17/22. Patient educated on surgical scrub, preadmission instructions, medical clearance and day of procedure medications, verbalizes understanding. Pt instructed to stop vitamins/supplements/herbal medications/ASA/NSAIDS for one week prior to surgery and discuss with PMD. Continue avenox as per neurologist.  Continue aspirin and plavix as per Dr Valencia's office. COVID swab done today at [facility], will follow up on results.

## 2022-03-14 NOTE — ASU PATIENT PROFILE, ADULT - FALL HARM RISK - UNIVERSAL INTERVENTIONS
Bed in lowest position, wheels locked, appropriate side rails in place/Call bell, personal items and telephone in reach/Instruct patient to call for assistance before getting out of bed or chair/Non-slip footwear when patient is out of bed/[place] to call system/Physically safe environment - no spills, clutter or unnecessary equipment/Purposeful Proactive Rounding/Room/bathroom lighting operational, light cord in reach

## 2022-03-17 ENCOUNTER — APPOINTMENT (OUTPATIENT)
Dept: NEUROSURGERY | Facility: HOSPITAL | Age: 57
End: 2022-03-17

## 2022-03-17 ENCOUNTER — INPATIENT (INPATIENT)
Facility: HOSPITAL | Age: 57
LOS: 0 days | Discharge: HOME | DRG: 27 | End: 2022-03-18
Attending: PSYCHIATRY & NEUROLOGY | Admitting: PSYCHIATRY & NEUROLOGY
Payer: COMMERCIAL

## 2022-03-17 VITALS
OXYGEN SATURATION: 99 % | HEART RATE: 63 BPM | TEMPERATURE: 98 F | SYSTOLIC BLOOD PRESSURE: 117 MMHG | DIASTOLIC BLOOD PRESSURE: 55 MMHG | RESPIRATION RATE: 14 BRPM

## 2022-03-17 DIAGNOSIS — I67.1 CEREBRAL ANEURYSM, NONRUPTURED: ICD-10-CM

## 2022-03-17 DIAGNOSIS — Z98.890 OTHER SPECIFIED POSTPROCEDURAL STATES: Chronic | ICD-10-CM

## 2022-03-17 LAB
BLD GP AB SCN SERPL QL: SIGNIFICANT CHANGE UP
PA ADP PRP-ACNC: 41 PRU — LOW (ref 180–376)
PLATELET RESPONSE ASPIRIN RESULT: 391 ARU — SIGNIFICANT CHANGE UP (ref 350–700)

## 2022-03-17 PROCEDURE — 61624 TCAT PERM OCCLS/EMBOLJ CNS: CPT

## 2022-03-17 PROCEDURE — 75898 FOLLOW-UP ANGIOGRAPHY: CPT | Mod: 26

## 2022-03-17 PROCEDURE — 36217 PLACE CATHETER IN ARTERY: CPT

## 2022-03-17 PROCEDURE — 75894 X-RAYS TRANSCATH THERAPY: CPT | Mod: 26

## 2022-03-17 PROCEDURE — 70496 CT ANGIOGRAPHY HEAD: CPT | Mod: 26

## 2022-03-17 RX ORDER — ACETAMINOPHEN 500 MG
1000 TABLET ORAL ONCE
Refills: 0 | Status: COMPLETED | OUTPATIENT
Start: 2022-03-17 | End: 2022-03-17

## 2022-03-17 RX ORDER — ONDANSETRON 8 MG/1
4 TABLET, FILM COATED ORAL EVERY 8 HOURS
Refills: 0 | Status: DISCONTINUED | OUTPATIENT
Start: 2022-03-17 | End: 2022-03-18

## 2022-03-17 RX ORDER — ACETAMINOPHEN 500 MG
650 TABLET ORAL EVERY 6 HOURS
Refills: 0 | Status: DISCONTINUED | OUTPATIENT
Start: 2022-03-17 | End: 2022-03-18

## 2022-03-17 RX ORDER — CLOPIDOGREL BISULFATE 75 MG/1
75 TABLET, FILM COATED ORAL ONCE
Refills: 0 | Status: COMPLETED | OUTPATIENT
Start: 2022-03-17 | End: 2022-03-17

## 2022-03-17 RX ORDER — ASPIRIN/CALCIUM CARB/MAGNESIUM 324 MG
81 TABLET ORAL DAILY
Refills: 0 | Status: DISCONTINUED | OUTPATIENT
Start: 2022-03-18 | End: 2022-03-18

## 2022-03-17 RX ORDER — SODIUM CHLORIDE 9 MG/ML
1000 INJECTION INTRAMUSCULAR; INTRAVENOUS; SUBCUTANEOUS
Refills: 0 | Status: DISCONTINUED | OUTPATIENT
Start: 2022-03-17 | End: 2022-03-17

## 2022-03-17 RX ORDER — ASPIRIN/CALCIUM CARB/MAGNESIUM 324 MG
81 TABLET ORAL ONCE
Refills: 0 | Status: COMPLETED | OUTPATIENT
Start: 2022-03-17 | End: 2022-03-17

## 2022-03-17 RX ORDER — CLOPIDOGREL BISULFATE 75 MG/1
75 TABLET, FILM COATED ORAL DAILY
Refills: 0 | Status: DISCONTINUED | OUTPATIENT
Start: 2022-03-18 | End: 2022-03-18

## 2022-03-17 RX ORDER — LANOLIN ALCOHOL/MO/W.PET/CERES
5 CREAM (GRAM) TOPICAL AT BEDTIME
Refills: 0 | Status: DISCONTINUED | OUTPATIENT
Start: 2022-03-17 | End: 2022-03-18

## 2022-03-17 RX ADMIN — Medication 400 MILLIGRAM(S): at 13:57

## 2022-03-17 RX ADMIN — Medication 81 MILLIGRAM(S): at 17:06

## 2022-03-17 RX ADMIN — Medication 1000 MILLIGRAM(S): at 14:30

## 2022-03-17 RX ADMIN — Medication 5 MILLIGRAM(S): at 21:37

## 2022-03-17 RX ADMIN — CLOPIDOGREL BISULFATE 75 MILLIGRAM(S): 75 TABLET, FILM COATED ORAL at 17:06

## 2022-03-17 RX ADMIN — SODIUM CHLORIDE 3 MILLILITER(S): 9 INJECTION INTRAMUSCULAR; INTRAVENOUS; SUBCUTANEOUS at 15:01

## 2022-03-17 RX ADMIN — SODIUM CHLORIDE 3 MILLILITER(S): 9 INJECTION INTRAMUSCULAR; INTRAVENOUS; SUBCUTANEOUS at 21:26

## 2022-03-17 NOTE — CHART NOTE - NSCHARTNOTEFT_GEN_A_CORE
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 Erik MD  Physician Assistant: Dior Schuster PA-C  Nurse: ELIZABET Parker, ELIZABET Ibarra    Anesthesiologist: Dr Bon Alfaro MD                                          Radiology Tech: RT Georges Christy, RT Alexandra Linares    Sheath: 5 American 90 cm BMX Sheath    I/Os: estimated blood loss less than 10cc,  IV fluids 1000 cc,   Urine out put    cc,   Contrast: Omnipaque 240 68 cc, 1 g Vanco  ABX    Vitals:BP   HR   Spo2  %    Preliminary Report:  Under  a  4 American short sheath via the right groin under MAC sedation  via left vertebral artery,  left interntal carotid artery, left external carotid artey, right vertebral artery, right internal carotid artery,  right external carotid artery, a selective cerebral angiography  was perofrmed and reveals                                                                                                        . ( Official note to follow).    Patient tolerated procedure well.  Patient remains hemodynamically stable, no change in neurological status compared to baseline.  Results were discussed with patient, patient's family and Neurosurgery.  Right groin sheath  was discontinued.   Hemostasis was obtained with approximately 21 minutes of manual compression.     No active bleeding, no hematoma, no ecchymosis.   Angio-seal device was applied, quick clot and safeguard balloon dressing applied at   Patient transfered to recovery room in stable condition. 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 Erik MD  Physician Assistant: Dior Schuster PA-C  Nurse: ELIZABET Parker, ELIZABET Ibarra    Anesthesiologist: Dr Bon Alfaro MD                                          Radiology Tech: RT Georges Christy, RT Alexandra Linares    Sheath: 5 Kittitian 90 cm BMX sheath    I/Os: estimated blood loss less than 10cc, IV fluids 1000 cc, Urine out put 400 cc, Contrast: Omnipaque 240 68 cc, 1 g Vanco pre-operatively    Vitals: /60 HR 89 Spo2 100%    Preliminary Report:  Under a 5 Kittitian 90 cm BMX sheath via the right groin under MAC sedation via left vertebral artery, left internal carotid artery was performed and reveals. (Official note to follow).    Patient tolerated procedure well. Patient remains hemodynamically stable, no change in neurological status compared to baseline.  Results were discussed with patient, patient's family and Neurosurgery.  Right groin sheath was discontinued. Hemostasis was obtained with approximately 15 minutes of manual compression.     No active bleeding, no hematoma, no ecchymosis.  Star Close device was applied, quick clot and safeguard balloon dressing applied at 12:30.   Patient transferred to NCCU in stable condition. Neurointerventional Surgery Post Procedure Note    Procedure: Selective Cerebral Angiography     Pre- Procedure Diagnosis: Left superior hypophyseal aneurysm width 3.2 mm/ height 2.3 mm/ neck 3.4 mm   Post- Procedure Diagnosis: s/p embolization with 3.75 mm X 14 mm Pipeline stent     : Dr. Erik MD  Physician Assistant: Dior Schuster PA-C  Nurse: ELIZABET Parker, ELIZABET Ibarra    Anesthesiologist: Dr Bon Alfaro MD                                          Radiology Tech: RT Georges Christy, RT Alexandra Linares    Sheath: 5 Uzbek 90 cm BMX sheath    I/Os: estimated blood loss less than 10cc, IV fluids 1000 cc, Urine out put 400 cc, Contrast: Omnipaque 240 68 cc, 1 g Vanco pre-operatively    Vitals: /60 HR 89 Spo2 100%    Preliminary Report:  Under a 5 Uzbek 90 cm BMX sheath via the right groin under MAC sedation via left vertebral artery, left internal carotid artery was performed and reveals. (Official note to follow).    Patient tolerated procedure well. Patient remains hemodynamically stable, no change in neurological status compared to baseline.  Results were discussed with patient, patient's family and Neurosurgery.  Right groin sheath was discontinued. Hemostasis was obtained with approximately 15 minutes of manual compression.     No active bleeding, no hematoma, no ecchymosis.  Star Close device was applied, quick clot and safeguard balloon dressing applied at 12:30.   Patient transferred to NCCU in stable condition.

## 2022-03-17 NOTE — CHART NOTE - NSCHARTNOTEFT_GEN_A_CORE
Neurointerventional Surgery  Pre-Procedure Note     This is a 57y ____ hand dominant Female    HPI:  56 year old female with PMH MS, cerebral aneurysm found on MRI when she moved from [place] to [place] and was establishing care, she has family history of a mother who had a left posterior communicating artery aneurysm, coiled by Dr. Rangel Acevedo. MRI performed in 01/2022 showed stable, extensive, confluent, T2 FLAIR hyperintense signal within the white matter, consistent with MS, and the MRA shows a possible 3.6 mm superior hypophyseal aneurysm. Patient is s/p cerebral angiogram with moderate sedation on 2/17/2022 with Dr. Leonor Mahajan which confirmed unruptured left superior hypophyseal artery aneurysm. Today at [facility] patient states her MS is well controlled with Avenox injections, she denies any visual changes, dizziness, lightheadedness, loss of sensation, difficulty with speech or trouble walking.  Patient is now scheduled for cerebral angiogram embolization with pipeline stent with Dr Erik Watkins 3/17/22.  Medical clearance pending  (14 Mar 2022 11:59)      Allergies: penicillin (Anaphylaxis)      PMH/PSH:  PAST MEDICAL & SURGICAL HISTORY:  Multiple sclerosis    Cerebral aneurysm, nonruptured    Cervical ca  LEEP procedure 1999    H/O LEEP  1999        Social History:   Social History:    FAMILY HISTORY:  FH: brain aneurysm (Mother)    FH: liver cancer (Father)    Family history of diabetes mellitus (DM) (Mother)    FH: breast cancer (Mother)        Current Medications:   sodium chloride 0.9% lock flush 3 milliLiter(s) IV Push every 8 hours  vancomycin  IVPB 1000 milliGRAM(s) IV Intermittent once      Physical Exam:  Constitutional: NAD    Neuro  * Mental Status:  GCS 15:  E(4), V(5), M(6).  Awake, alert, oriented to conversation.  * Cranial Nerves: Cnii-Cnxii grossly intact. PERRL, EOMI, tongue midline, no gaze deviation  * Motor: RUE 5/5, LUE 5/5, RLE 5/5, LLE 5/5  * Sensory: Sensation intact to light touch  * Reflexes: Not assessed  * Gait: Not assessed    Cardiovascular:  S1, S2 no murmurs appreciated.  Regular rate and rhythm.  Eyes: See neurologic examination with detailed examination of eyes.  ENT: No JVD, Trachea Midline.  Respiratory: Clear to auscultation.  Gastrointestinal: Soft, nontender, nondistended.  Genitourinary: [ ] Agee, [ x ] No Agee.   Musculoskeletal: No muscle wasting noted, (See neuorlogic assessment for full muscle strength assessment) No pretibial edema appreciated, no appreciable calf tenderness.  Skin:  Wound inspected, no redness, bleeding or drainage noted.    Hematologic / Lymph / Immunologic: No bleeding from IV sites or wounds, No lymphadenopathy, No Hives or allergic type skin lesions      NIH SS:  DATE:  TIME:  1A: Level of consciousness (0-3): 0  1B: Questions (0-2): 0    1C: Commands (0-2): 0  2: Gaze (0-2): 0  3: Visual fields (0-3): 0  4: Facial palsy (0-3): 0  MOTOR:  5A: Left arm motor drift (0-4): 0  5B: Right arm motor drift (0-4): 0  6A: Left leg motor drift (0-4): 0  6B: Right leg motor drift (0-4): 0  7: Limb ataxia (0-2): 0  SENSORY:  8: Sensation (0-2): 0  SPEECH:  9: Language (0-3): 0  10: Dysarthria (0-2): 0  EXTINCTION:  11: Extinction/inattention (0-2): 0    TOTAL SCORE:     Labs:                       Blood Bank:         Assessment/Plan:   This is a 57y  year old right / left hand dominant Female  presents with   Patient presents to neuro-IR for selective cerebral angiography.     Procedure, goals, risks, benefits and alternatives  were discussed with patient and (patient's family).  All questions were answered.  Risks include but are not limited to stroke, vessel injury, hemorrhage, and or groin hematoma.  Patient demonstrates understanding  of all risks involved with this procedure and wishes to continue.   Appropriate  content was obtained from patient and consent is in the patient's chart. Neurointerventional Surgery  Pre-Procedure Note     This is a 57y ____ hand dominant Female    HPI: 56 year old female with PMH MS, cerebral aneurysm found on MRI when she moved from [place] and was establishing care, she has family history of a mother who had a left posterior communicating artery aneurysm, coiled by Dr. Rangel Acevedo. MRI performed in 01/2022 showed stable, extensive, confluent, T2 FLAIR hyperintense signal within the white matter, consistent with MS, and the MRA shows a possible 3.6 mm superior hypophyseal aneurysm. Patient is s/p cerebral angiogram with moderate sedation on 2/17/2022 with Dr. Leonor Mahajan which confirmed unruptured left superior hypophyseal artery aneurysm. Today at [facility] patient states her MS is well controlled with Avenox injections, she denies any visual changes, dizziness, lightheadedness, loss of sensation, difficulty with speech or trouble walking.  Patient is now scheduled for cerebral angiogram embolization with pipeline stent with Dr Erik Watkins 3/17/22.  Medical clearance pending  (14 Mar 2022 11:59)      Allergies: penicillin (Anaphylaxis)      PMH/PSH:  PAST MEDICAL & SURGICAL HISTORY:  Multiple sclerosis    Cerebral aneurysm, nonruptured    Cervical ca  LEEP procedure 1999    H/O LEEP  1999    Social History:   Works as Ground [employer]    FAMILY HISTORY:  FH: brain aneurysm (Mother)    FH: liver cancer (Father)    Family history of diabetes mellitus (DM) (Mother)    FH: breast cancer (Mother)        Physical Exam:  Constitutional: NAD    Neuro  * Mental Status:  GCS 15:  E(4), V(5), M(6).  Awake, alert, oriented to conversation.  * Cranial Nerves: Cnii-Cnxii grossly intact. PERRL, EOMI, tongue midline, no gaze deviation  * Motor: RUE 5/5, LUE 5/5, RLE 5/5, LLE 5/5  * Sensory: Sensation intact to light touch  * Reflexes: Not assessed  * Gait: Not assessed    Cardiovascular:  S1, S2 no murmurs appreciated.  Regular rate and rhythm.  Eyes: See neurologic examination with detailed examination of eyes.  ENT: No JVD, Trachea Midline.  Respiratory: Clear to auscultation.  Gastrointestinal: Soft, nontender, nondistended.  Genitourinary: [ ] Agee, [ x ] No Agee.   Musculoskeletal: No muscle wasting noted, (See neurologic assessment for full muscle strength assessment) No pretibial edema appreciated, no appreciable calf tenderness.  Skin:  Wound inspected, no redness, bleeding or drainage noted.    Hematologic / Lymph / Immunologic: No bleeding from IV sites or wounds, No lymphadenopathy, No Hives or allergic type skin lesions      NIH SS:  DATE: 3/17/22  TIME: 0730  1A: Level of consciousness (0-3): 0  1B: Questions (0-2): 0    1C: Commands (0-2): 0  2: Gaze (0-2): 0  3: Visual fields (0-3): 0  4: Facial palsy (0-3): 0  MOTOR:  5A: Left arm motor drift (0-4): 0  5B: Right arm motor drift (0-4): 0  6A: Left leg motor drift (0-4): 0  6B: Right leg motor drift (0-4): 0  7: Limb ataxia (0-2): 0  SENSORY:  8: Sensation (0-2): 0  SPEECH:  9: Language (0-3): 0  10: Dysarthria (0-2): 0  EXTINCTION:  11: Extinction/inattention (0-2): 0    TOTAL SCORE: 0      Assessment/Plan:   Patient presents to neuro-IR for selective cerebral angiography with planned embolization left superior hypophyseal aneurysm w Pipeline stent.      Procedure, goals, risks, benefits and alternatives  were discussed with patient. All questions were answered.  Risks include but are not limited to stroke, vessel injury, hemorrhage, and or groin hematoma. Patient demonstrates understanding of all risks involved with this procedure and wishes to continue. Appropriate consent was obtained from patient and consent is in the patient's chart. Neurointerventional Surgery  Pre-Procedure Note     HPI: 56 year old female with PMH MS, cerebral aneurysm found on MRI when [place] moved [place] and was establishing care, she has family history of a mother who had a left posterior communicating artery aneurysm, coiled by Dr. Rangel Acevedo. MRI performed in 01/2022 showed stable, extensive, confluent, T2 FLAIR hyperintense signal within the white matter, consistent with MS, and the MRA shows a possible 3.6 mm superior hypophyseal aneurysm. Patient is s/p cerebral angiogram with moderate sedation on 2/17/2022 with Dr. Leonor Mahajan which confirmed unruptured left superior hypophyseal artery aneurysm. Today at [facility] patient states her MS is well controlled with Avenox injections, she denies any visual changes, dizziness, lightheadedness, loss of sensation, difficulty with speech or trouble walking.  Patient is now scheduled for cerebral angiogram embolization with pipeline stent with Dr Erik Watkins 3/17/22.  Medical clearance pending  (14 Mar 2022 11:59)      Allergies: penicillin (Anaphylaxis)      PMH/PSH:  PAST MEDICAL & SURGICAL HISTORY:  Multiple sclerosis    Cerebral aneurysm, nonruptured    Cervical ca  LEEP procedure 1999    H/O LEEP  1999    Social History:   Works as Ground Traffic [employer]    FAMILY HISTORY:  FH: brain aneurysm (Mother)    FH: liver cancer (Father)    Family history of diabetes mellitus (DM) (Mother)    FH: breast cancer (Mother)        Physical Exam:  Constitutional: NAD    Neuro  * Mental Status:  GCS 15:  E(4), V(5), M(6).  Awake, alert, oriented to conversation.  * Cranial Nerves: Cnii-Cnxii grossly intact. PERRL, EOMI, tongue midline, no gaze deviation  * Motor: RUE 5/5, LUE 5/5, RLE 5/5, LLE 5/5  * Sensory: Sensation intact to light touch  * Reflexes: Not assessed  * Gait: Not assessed    Cardiovascular:  S1, S2 no murmurs appreciated.  Regular rate and rhythm.  Eyes: See neurologic examination with detailed examination of eyes.  ENT: No JVD, Trachea Midline.  Respiratory: Clear to auscultation.  Gastrointestinal: Soft, nontender, nondistended.  Genitourinary: [ ] Agee, [ x ] No Agee.   Musculoskeletal: No muscle wasting noted, (See neurologic assessment for full muscle strength assessment) No pretibial edema appreciated, no appreciable calf tenderness.  Skin:  Wound inspected, no redness, bleeding or drainage noted.    Hematologic / Lymph / Immunologic: No bleeding from IV sites or wounds, No lymphadenopathy, No Hives or allergic type skin lesions      NIH SS:  DATE: 3/17/22  TIME: 0730  1A: Level of consciousness (0-3): 0  1B: Questions (0-2): 0    1C: Commands (0-2): 0  2: Gaze (0-2): 0  3: Visual fields (0-3): 0  4: Facial palsy (0-3): 0  MOTOR:  5A: Left arm motor drift (0-4): 0  5B: Right arm motor drift (0-4): 0  6A: Left leg motor drift (0-4): 0  6B: Right leg motor drift (0-4): 0  7: Limb ataxia (0-2): 0  SENSORY:  8: Sensation (0-2): 0  SPEECH:  9: Language (0-3): 0  10: Dysarthria (0-2): 0  EXTINCTION:  11: Extinction/inattention (0-2): 0    TOTAL SCORE: 0      Assessment/Plan:   Patient presents to neuro-IR for selective cerebral angiography with planned embolization left superior hypophyseal aneurysm w Pipeline stent.      Procedure, goals, risks, benefits and alternatives  were discussed with patient. All questions were answered.  Risks include but are not limited to stroke, vessel injury, hemorrhage, and or groin hematoma. Patient demonstrates understanding of all risks involved with this procedure and wishes to continue. Appropriate consent was obtained from patient and consent is in the patient's chart.

## 2022-03-17 NOTE — PATIENT PROFILE ADULT - FALL HARM RISK - HARM RISK INTERVENTIONS

## 2022-03-17 NOTE — PROVIDER CONTACT NOTE (OTHER) - SITUATION
BP 87/53, MAP 65. Neuro PA Keila notified. PA entered order that team is ok with MAPs >60. Pt is asymptomatic, no intervention at this time.

## 2022-03-18 ENCOUNTER — TRANSCRIPTION ENCOUNTER (OUTPATIENT)
Age: 57
End: 2022-03-18

## 2022-03-18 VITALS
OXYGEN SATURATION: 99 % | DIASTOLIC BLOOD PRESSURE: 61 MMHG | TEMPERATURE: 98 F | HEART RATE: 67 BPM | RESPIRATION RATE: 17 BRPM | SYSTOLIC BLOOD PRESSURE: 108 MMHG

## 2022-03-18 LAB
ANION GAP SERPL CALC-SCNC: 9 MMOL/L — SIGNIFICANT CHANGE UP (ref 5–17)
BUN SERPL-MCNC: 8.9 MG/DL — SIGNIFICANT CHANGE UP (ref 8–20)
CALCIUM SERPL-MCNC: 8.4 MG/DL — LOW (ref 8.6–10.2)
CHLORIDE SERPL-SCNC: 107 MMOL/L — SIGNIFICANT CHANGE UP (ref 98–107)
CO2 SERPL-SCNC: 25 MMOL/L — SIGNIFICANT CHANGE UP (ref 22–29)
CREAT SERPL-MCNC: 0.69 MG/DL — SIGNIFICANT CHANGE UP (ref 0.5–1.3)
EGFR: 101 ML/MIN/1.73M2 — SIGNIFICANT CHANGE UP
GLUCOSE SERPL-MCNC: 135 MG/DL — HIGH (ref 70–99)
HCT VFR BLD CALC: 32.6 % — LOW (ref 34.5–45)
HGB BLD-MCNC: 10.9 G/DL — LOW (ref 11.5–15.5)
MAGNESIUM SERPL-MCNC: 2 MG/DL — SIGNIFICANT CHANGE UP (ref 1.6–2.6)
MCHC RBC-ENTMCNC: 29.9 PG — SIGNIFICANT CHANGE UP (ref 27–34)
MCHC RBC-ENTMCNC: 33.4 GM/DL — SIGNIFICANT CHANGE UP (ref 32–36)
MCV RBC AUTO: 89.3 FL — SIGNIFICANT CHANGE UP (ref 80–100)
PHOSPHATE SERPL-MCNC: 4.1 MG/DL — SIGNIFICANT CHANGE UP (ref 2.4–4.7)
PLATELET # BLD AUTO: 215 K/UL — SIGNIFICANT CHANGE UP (ref 150–400)
POTASSIUM SERPL-MCNC: 3.7 MMOL/L — SIGNIFICANT CHANGE UP (ref 3.5–5.3)
POTASSIUM SERPL-SCNC: 3.7 MMOL/L — SIGNIFICANT CHANGE UP (ref 3.5–5.3)
RBC # BLD: 3.65 M/UL — LOW (ref 3.8–5.2)
RBC # FLD: 13 % — SIGNIFICANT CHANGE UP (ref 10.3–14.5)
SODIUM SERPL-SCNC: 141 MMOL/L — SIGNIFICANT CHANGE UP (ref 135–145)
WBC # BLD: 7.14 K/UL — SIGNIFICANT CHANGE UP (ref 3.8–10.5)
WBC # FLD AUTO: 7.14 K/UL — SIGNIFICANT CHANGE UP (ref 3.8–10.5)

## 2022-03-18 PROCEDURE — 61624 TCAT PERM OCCLS/EMBOLJ CNS: CPT

## 2022-03-18 PROCEDURE — C9399: CPT

## 2022-03-18 PROCEDURE — 70544 MR ANGIOGRAPHY HEAD W/O DYE: CPT | Mod: 26,59

## 2022-03-18 PROCEDURE — 84100 ASSAY OF PHOSPHORUS: CPT

## 2022-03-18 PROCEDURE — 70544 MR ANGIOGRAPHY HEAD W/O DYE: CPT

## 2022-03-18 PROCEDURE — 71046 X-RAY EXAM CHEST 2 VIEWS: CPT

## 2022-03-18 PROCEDURE — 76377 3D RENDER W/INTRP POSTPROCES: CPT

## 2022-03-18 PROCEDURE — 86900 BLOOD TYPING SEROLOGIC ABO: CPT

## 2022-03-18 PROCEDURE — 93005 ELECTROCARDIOGRAM TRACING: CPT

## 2022-03-18 PROCEDURE — C1760: CPT

## 2022-03-18 PROCEDURE — 70551 MRI BRAIN STEM W/O DYE: CPT | Mod: 26

## 2022-03-18 PROCEDURE — 99233 SBSQ HOSP IP/OBS HIGH 50: CPT

## 2022-03-18 PROCEDURE — C1769: CPT

## 2022-03-18 PROCEDURE — 36224 PLACE CATH CAROTD ART: CPT

## 2022-03-18 PROCEDURE — 75894 X-RAYS TRANSCATH THERAPY: CPT

## 2022-03-18 PROCEDURE — 86850 RBC ANTIBODY SCREEN: CPT

## 2022-03-18 PROCEDURE — 36228 PLACE CATH INTRACRANIAL ART: CPT

## 2022-03-18 PROCEDURE — 83735 ASSAY OF MAGNESIUM: CPT

## 2022-03-18 PROCEDURE — C1889: CPT

## 2022-03-18 PROCEDURE — 70551 MRI BRAIN STEM W/O DYE: CPT

## 2022-03-18 PROCEDURE — 36415 COLL VENOUS BLD VENIPUNCTURE: CPT

## 2022-03-18 PROCEDURE — 85576 BLOOD PLATELET AGGREGATION: CPT

## 2022-03-18 PROCEDURE — 93010 ELECTROCARDIOGRAM REPORT: CPT

## 2022-03-18 PROCEDURE — C1887: CPT

## 2022-03-18 PROCEDURE — 86901 BLOOD TYPING SEROLOGIC RH(D): CPT

## 2022-03-18 PROCEDURE — 80048 BASIC METABOLIC PNL TOTAL CA: CPT

## 2022-03-18 PROCEDURE — C1894: CPT

## 2022-03-18 PROCEDURE — 75898 FOLLOW-UP ANGIOGRAPHY: CPT

## 2022-03-18 PROCEDURE — 70548 MR ANGIOGRAPHY NECK W/DYE: CPT

## 2022-03-18 PROCEDURE — 70548 MR ANGIOGRAPHY NECK W/DYE: CPT | Mod: 26

## 2022-03-18 PROCEDURE — 85027 COMPLETE CBC AUTOMATED: CPT

## 2022-03-18 PROCEDURE — G0463: CPT

## 2022-03-18 RX ORDER — ACETAMINOPHEN 500 MG
2 TABLET ORAL
Qty: 0 | Refills: 0 | DISCHARGE
Start: 2022-03-18

## 2022-03-18 RX ORDER — ASPIRIN/CALCIUM CARB/MAGNESIUM 324 MG
0 TABLET ORAL
Qty: 0 | Refills: 0 | DISCHARGE

## 2022-03-18 RX ORDER — ASPIRIN/CALCIUM CARB/MAGNESIUM 324 MG
1 TABLET ORAL
Qty: 0 | Refills: 0 | DISCHARGE
Start: 2022-03-18

## 2022-03-18 RX ORDER — CLOPIDOGREL BISULFATE 75 MG/1
1 TABLET, FILM COATED ORAL
Qty: 0 | Refills: 0 | DISCHARGE

## 2022-03-18 RX ORDER — CLOPIDOGREL BISULFATE 75 MG/1
1 TABLET, FILM COATED ORAL
Qty: 0 | Refills: 0 | DISCHARGE
Start: 2022-03-18

## 2022-03-18 RX ADMIN — SODIUM CHLORIDE 3 MILLILITER(S): 9 INJECTION INTRAMUSCULAR; INTRAVENOUS; SUBCUTANEOUS at 13:53

## 2022-03-18 RX ADMIN — Medication 650 MILLIGRAM(S): at 09:00

## 2022-03-18 RX ADMIN — CLOPIDOGREL BISULFATE 75 MILLIGRAM(S): 75 TABLET, FILM COATED ORAL at 11:29

## 2022-03-18 RX ADMIN — Medication 81 MILLIGRAM(S): at 11:29

## 2022-03-18 RX ADMIN — Medication 650 MILLIGRAM(S): at 15:00

## 2022-03-18 RX ADMIN — SODIUM CHLORIDE 3 MILLILITER(S): 9 INJECTION INTRAMUSCULAR; INTRAVENOUS; SUBCUTANEOUS at 05:12

## 2022-03-18 RX ADMIN — Medication 650 MILLIGRAM(S): at 14:00

## 2022-03-18 RX ADMIN — Medication 650 MILLIGRAM(S): at 07:59

## 2022-03-18 NOTE — DISCHARGE NOTE PROVIDER - NSDCCPCAREPLAN_GEN_ALL_CORE_FT
PRINCIPAL DISCHARGE DIAGNOSIS  Diagnosis: Status post coil embolization of cerebral aneurysm  Assessment and Plan of Treatment: You had Pipeline stent placed to embolize left superior hypophyseal aneurysm. It is imperative that you take medications, aspirin and Plavix, as directed. Follwo up with Dr Valencia in 2 weeks.

## 2022-03-18 NOTE — PROGRESS NOTE ADULT - SUBJECTIVE AND OBJECTIVE BOX
HPI:  56 year old female with PMH MS, cerebral aneurysm found on MRI when [place] and was establishing care, she has family history of a mother who had a left posterior communicating artery aneurysm, coiled by Dr. Rangel Acevedo. MRI performed in 01/2022 showed stable, extensive, confluent, T2 FLAIR hyperintense signal within the white matter, consistent with MS, and the MRA shows a possible 3.6 mm superior hypophyseal aneurysm. Patient is s/p cerebral angiogram with moderate sedation on 2/17/2022 with Dr. Leonor Mahajan which confirmed unruptured left superior hypophyseal artery aneurysm. Today at [facility] patient states her MS is well controlled with Avenox injections, she denies any visual changes, dizziness, lightheadedness, loss of sensation, difficulty with speech or trouble walking.  Patient is now scheduled for cerebral angiogram embolization with pipeline stent with Dr Erik Watkins 3/17/22.  Medical clearance pending  (14 Mar 2022 11:59)    No o/n events.  ROS: negative.    VITALS:  T(C): , Max: 37.1 (03-17-22 @ 20:30)  HR:  (52 - 75)  BP:  (83/52 - 106/56)  ABP:  (108/53 - 122/52)  RR:  (9 - 25)  SpO2:  (93% - 100%)  Wt(kg): --      03-17-22 @ 07:01  -  03-18-22 @ 07:00  --------------------------------------------------------  IN: 1600 mL / OUT: 900 mL / NET: 700 mL    03-18-22 @ 07:01  -  03-18-22 @ 15:48  --------------------------------------------------------  IN: 480 mL / OUT: 0 mL / NET: 480 mL      LABS:  Na: 141 (03-18 @ 03:58)  K: 3.7 (03-18 @ 03:58)  Cl: 107 (03-18 @ 03:58)  CO2: 25.0 (03-18 @ 03:58)  BUN: 8.9 (03-18 @ 03:58)  Cr: 0.69 (03-18 @ 03:58)  Glu: 135(03-18 @ 03:58)    Hgb: 10.9 (03-18 @ 03:58)  Hct: 32.6 (03-18 @ 03:58)  WBC: 7.14 (03-18 @ 03:58)  Plt: 215 (03-18 @ 03:58)    IMAGING:   Recent imaging studies were reviewed.    MEDICATIONS:  acetaminophen     Tablet .. 650 milliGRAM(s) Oral every 6 hours PRN  aspirin  chewable 81 milliGRAM(s) Oral daily  clopidogrel Tablet 75 milliGRAM(s) Oral daily  melatonin 5 milliGRAM(s) Oral at bedtime  ondansetron Injectable 4 milliGRAM(s) IV Push every 8 hours PRN  sodium chloride 0.9% lock flush 3 milliLiter(s) IV Push every 8 hours  vancomycin  IVPB 1000 milliGRAM(s) IV Intermittent once    EXAMINATION:  General:  calm, cooperative.  HEENT:  MMM  Neuro:  awake, alert, oriented x 3, follows commands, moves all extremities  Cards:  RRR  Respiratory:  no respiratory distress  Abdomen:  soft  Extremities:  no edema  Skin:  warm/dry

## 2022-03-18 NOTE — DISCHARGE NOTE PROVIDER - HOSPITAL COURSE
56 year old female with PMH MS, well controlled with Avenox injections  cerebral aneurysm found on MRI when [place] and was establishing care, she has family history of a mother who had a left posterior communicating artery aneurysm, coiled by Dr. Rangel Acevedo. MRI performed in 01/2022 showed stable, extensive, confluent, T2 FLAIR hyperintense signal within the white matter, consistent with MS, and the MRA shows a possible 3.6 mm superior hypophyseal aneurysm. Patient is s/p cerebral angiogram with moderate sedation on 2/17/2022 with Dr. Leonor Mahajan which confirmed unruptured left superior hypophyseal artery aneurysm.   Patient admitted on 3/17/22 for planned embolization procedure. Patient is now POD 1 s/p embolization with 3.75 mm X 14 mm Pipeline stent of a Left superior hypophyseal aneurysm measuring width 3.2 mm/ height 2.3 mm/ neck 3.4 mm. Patient is doing well. Had a headache last night but reports now has resolved.  Patient has remained clinically and hemodynamically stable. Patient had MRI brain and MRA head w NOVA today. Images reviewed by Dr Valencia. No infarcts, patent Pipeline stent.       < from: MR Angio Neck w/ IV Cont (03.18.22 @ 17:13) >      IMPRESSION: Extensive stable confluent white matter changes consistent   with demyelinating disease unchanged since 12/13/2021. Signal dropout in   the left supraclinoid internal carotid artery related to the presence of   a pipeline stent. Good intracranial flow using noninvasive flow MR   angiography. No acute infarcts.    < end of copied text >    Patient advised to continue taking aspirin and Plavix as directed. Patient advised to follow up w Dr Valencia in 2 weeks.      Pt expresses understanding and agreement with plan of discharge and follow up as outlined above. Patient has had the opportunity to ask questions and all questions have been answered, Patient is in no acute distress at discharge and vital signs are stable. Patient is advised to return to the ED immediately with any worsening or worrisome symptoms.

## 2022-03-18 NOTE — DISCHARGE NOTE PROVIDER - NSDCMRMEDTOKEN_GEN_ALL_CORE_FT
acetaminophen 325 mg oral tablet: 2 tab(s) orally every 6 hours, As needed, Mild Pain (1 - 3)  aspirin 81 mg oral tablet, chewable: 1 tab(s) orally once a day  Avonex 30 mcg intramuscular injection: 0.5 milliliter(s) intramuscular once a week  clopidogrel 75 mg oral tablet: 1 tab(s) orally once a day

## 2022-03-18 NOTE — PROGRESS NOTE ADULT - ASSESSMENT
55 yo F with Left superior hypophyseal aneurysm s/p embolization with pipeline stent.  PMH of MS.  Plan:  neurochecks  pending MRI/MRA  cont DAPT  maintain normotension  diet as tolerated  monitor e-lytes  SCDs, mobilize, OOB  possible d/c today after MR, MIREILLE clearance    
56 year old female with PMH MS, well controlled with Avenox injections  cerebral aneurysm found on MRI when [place] moved [place] and was establishing care, she has family history of a mother who had a left posterior communicating artery aneurysm, coiled by Dr. Rangel Acevedo. MRI performed in 01/2022 showed stable, extensive, confluent, T2 FLAIR hyperintense signal within the white matter, consistent with MS, and the MRA shows a possible 3.6 mm superior hypophyseal aneurysm. Patient is s/p cerebral angiogram with moderate sedation on 2/17/2022 with Dr. Leonor Mahajan which confirmed unruptured left superior hypophyseal artery aneurysm.     INTERVAL:  Patient is now POD 1 s/p embolization with 3.75 mm X 14 mm Pipeline stent of a Left superior hypophyseal aneurysm measuring width 3.2 mm/ height 2.3 mm/ neck 3.4 mm. Patient is doing well. Had a headache last night but reports now has resolved.        - Patient has remained clinically and neurologically stable  - Will likely be discharged today after review of MRI/MRA [facility]  - Will continue w DAPT therapy and follow up with Dr Valencia in 2 weeks  - Post angio groin check performed. Right groin is noted without hematoma, swelling, drainage, bleeding, ecchymosis. Pedal pulses intact.

## 2022-03-18 NOTE — DISCHARGE NOTE PROVIDER - CARE PROVIDER_API CALL
Leonor Bentley)  Neurology; Vascular Neurology  [address] [place] [address]  Phone: [phone]  Fax: [phone]  Follow Up Time:

## 2022-03-18 NOTE — DISCHARGE NOTE NURSING/CASE MANAGEMENT/SOCIAL WORK - NSDCPEFALRISK_GEN_ALL_CORE
For information on Fall & Injury Prevention, visit: [email or web address] OR  [email or web address] OR  https://www.cdc.gov/steadi/patient.html

## 2022-03-18 NOTE — PROGRESS NOTE ADULT - SUBJECTIVE AND OBJECTIVE BOX
HPI:  HPI:  56 year old female with PMH MS, cerebral aneurysm found on MRI when she moved from [place] to [place] and was establishing care, she has family history of a mother who had a left posterior communicating artery aneurysm, coiled by Dr. Rangel Acevedo. MRI performed in 01/2022 showed stable, extensive, confluent, T2 FLAIR hyperintense signal within the white matter, consistent with MS, and the MRA shows a possible 3.6 mm superior hypophyseal aneurysm. Patient is s/p cerebral angiogram with moderate sedation on 2/17/2022 with Dr. Leonor Mahajan which confirmed unruptured left superior hypophyseal artery aneurysm. Today at [facility] patient states her MS is well controlled with Avenox injections, she denies any visual changes, dizziness, lightheadedness, loss of sensation, difficulty with speech or trouble walking.  Patient is now scheduled for cerebral angiogram embolization with pipeline stent with Dr Erik Watkins 3/17/22.  Medical clearance pending  (14 Mar 2022 11:59)      INTERVAL HPI/OVERNIGHT EVENTS:  57y Female s/p __ seen lying comfortably in bed. Tolerating diet. Passing gas/BM. Voiding. Agee in with __ clear urine. Denies headache, weakness, numbness, n/v/d, fevers, chills, chest pain, SOB.     Vital Signs Last 24 Hrs  T(C): 36.8 (18 Mar 2022 04:18), Max: 37.1 (17 Mar 2022 20:30)  T(F): 98.3 (18 Mar 2022 04:18), Max: 98.7 (17 Mar 2022 20:30)  HR: 59 (18 Mar 2022 06:00) (54 - 81)  BP: 90/51 (18 Mar 2022 06:00) (83/52 - 117/55)  BP(mean): 64 (18 Mar 2022 06:00) (61 - 76)  RR: 15 (18 Mar 2022 06:00) (9 - 17)  SpO2: 95% (18 Mar 2022 06:00) (95% - 100%)    PHYSICAL EXAM:  GENERAL: NAD, well-groomed, well-developed  HEAD:  Atraumatic, normocephalic  DRAINS:   WOUND: Dressing clean dry intact  JOSE COMA SCORE: E- V- M- =       E: 4= opens eyes spontaneously 3= to voice 2= to noxious 1= no opening       V: 5= oriented 4= confused 3= inappropriate words 2= incomprehensible sounds 1= nonverbal 1T= intubated       M: 6= follows commands 5= localizes 4= withdraws 3= flexor posturing 2= extensor posturing 1= no movement  MENTAL STATUS: AAO x3; Awake/Comatose; Opens eyes spontaneously/to voice/to light touch/to noxious stimuli; Appropriately conversant without aphasia/Nonverbal; following simple commands/mimicking/not following commands  CRANIAL NERVES: Visual acuity normal for age, visual fields full to confrontation, PERRL. EOMI without nystagmus. Facial sensation intact V1-3 distribution b/l. Face symmetric w/ normal eye closure and smile, tongue midline. Hearing grossly intact. Speech clear. Head turning and shoulder shrug intact.   REFLEXES: PERRL. Corneals intact b/l. Gag intact. Cough intact. Oculocephalic reflex intact (Doll's eye). Negative Dobbs's b/l. Negative clonus b/l  MOTOR: strength 5/5 b/l upper and lower extremities  Uppers     Delt (C5/6)     Bicep (C5/6)     Wrist Extend (C6)     Tricep (C7)     HG (C8/T1)  R                     5/5                 5/5                         5/5                           5/5                   5/5  L                      5/5                 5/5                         5/5                           5/5                   5/5  Lowers      HF(L1/L2)     KE (L3)     DF (L4)     EHL (L5)     PF (S1)      R                     5/5              5/5           5/5           5/5            5/5  L                     5/5               5/5          5/5            5/5            5/5  SENSATION: grossly intact to light touch all extremities  COORDINATION: Gait intact; rapid alternating movements intact; heel to shin intact; no upper extremity dysmetria  CHEST/LUNG: Clear to auscultation bilaterally; no rales, rhonchi, wheezing, or rubs  HEART: +S1/+S2; Regular rate and rhythm; no murmurs, rubs, or gallops  ABDOMEN: Soft, nontender, nondistended; bowel sounds present all four quadrants  EXTREMITIES:  2+ peripheral pulses, no clubbing, cyanosis, or edema  SKIN: Warm, dry; no rashes or lesions    LABS:                        10.9   7.14  )-----------( 215      ( 18 Mar 2022 03:58 )             32.6     03-18    141  |  107  |  8.9  ----------------------------<  135<H>  3.7   |  25.0  |  0.69    Ca    8.4<L>      18 Mar 2022 03:58  Phos  4.1     03-18  Mg     2.0     03-18 03-17 @ 07:01  -  03-18 @ 07:00  --------------------------------------------------------  IN: 1600 mL / OUT: 900 mL / NET: 700 mL        RADIOLOGY & ADDITIONAL TESTS:          CAPRINI SCORE [CLOT]:  Patient has an estimated Caprini score of greater than 5.  However, the patient's unique clinical situation will be addressed in an individual manner to determine appropriate anticoagulation treatment, if any. HPI: 56 year old female with PMH MS, well controlled with Avenox injections  cerebral aneurysm found on MRI when she moved from [place] and was establishing care, she has family history of a mother who had a left posterior communicating artery aneurysm, coiled by Dr. Rangel Acevedo. MRI performed in 01/2022 showed stable, extensive, confluent, T2 FLAIR hyperintense signal within the white matter, consistent with MS, and the MRA shows a possible 3.6 mm superior hypophyseal aneurysm. Patient is s/p cerebral angiogram with moderate sedation on 2/17/2022 with Dr. Leonor Mahajan which confirmed unruptured left superior hypophyseal artery aneurysm.     INTERVAL:  Patient is now POD 1 s/p embolization with 3.75 mm X 14 mm Pipeline stent of a Left superior hypophyseal aneurysm measuring width 3.2 mm/ height 2.3 mm/ neck 3.4 mm. Patient is doing well. Had a headache last night but reports now has resolved.          Vital Signs Last 24 Hrs  T(C): 36.8 (18 Mar 2022 04:18), Max: 37.1 (17 Mar 2022 20:30)  T(F): 98.3 (18 Mar 2022 04:18), Max: 98.7 (17 Mar 2022 20:30)  HR: 59 (18 Mar 2022 06:00) (54 - 81)  BP: 90/51 (18 Mar 2022 06:00) (83/52 - 117/55)  BP(mean): 64 (18 Mar 2022 06:00) (61 - 76)  RR: 15 (18 Mar 2022 06:00) (9 - 17)  SpO2: 95% (18 Mar 2022 06:00) (95% - 100%)    PHYSICAL EXAM:  GENERAL: NAD, well-groomed, well-developed  HEAD:  Atraumatic, normocephalic  DRAINS:   WOUND: Dressing clean dry intact  JOSE COMA SCORE: E- V- M- =       E: 4= opens eyes spontaneously 3= to voice 2= to noxious 1= no opening       V: 5= oriented 4= confused 3= inappropriate words 2= incomprehensible sounds 1= nonverbal 1T= intubated       M: 6= follows commands 5= localizes 4= withdraws 3= flexor posturing 2= extensor posturing 1= no movement  MENTAL STATUS: AAO x3; Awake/Comatose; Opens eyes spontaneously/to voice/to light touch/to noxious stimuli; Appropriately conversant without aphasia/Nonverbal; following simple commands/mimicking/not following commands  CRANIAL NERVES: Visual acuity normal for age, visual fields full to confrontation, PERRL. EOMI without nystagmus. Facial sensation intact V1-3 distribution b/l. Face symmetric w/ normal eye closure and smile, tongue midline. Hearing grossly intact. Speech clear. Head turning and shoulder shrug intact.   REFLEXES: PERRL. Corneals intact b/l. Gag intact. Cough intact. Oculocephalic reflex intact (Doll's eye). Negative Dobbs's b/l. Negative clonus b/l  MOTOR: strength 5/5 b/l upper and lower extremities  Uppers     Delt (C5/6)     Bicep (C5/6)     Wrist Extend (C6)     Tricep (C7)     HG (C8/T1)  R                     5/5                 5/5                         5/5                           5/5                   5/5  L                      5/5                 5/5                         5/5                           5/5                   5/5  Lowers      HF(L1/L2)     KE (L3)     DF (L4)     EHL (L5)     PF (S1)      R                     5/5              5/5           5/5           5/5            5/5  L                     5/5               5/5          5/5            5/5            5/5  SENSATION: grossly intact to light touch all extremities  COORDINATION: Gait intact; rapid alternating movements intact; heel to shin intact; no upper extremity dysmetria  CHEST/LUNG: Clear to auscultation bilaterally; no rales, rhonchi, wheezing, or rubs  HEART: +S1/+S2; Regular rate and rhythm; no murmurs, rubs, or gallops  ABDOMEN: Soft, nontender, nondistended; bowel sounds present all four quadrants  EXTREMITIES:  2+ peripheral pulses, no clubbing, cyanosis, or edema  SKIN: Warm, dry; no rashes or lesions    LABS:                        10.9   7.14  )-----------( 215      ( 18 Mar 2022 03:58 )             32.6     03-18    141  |  107  |  8.9  ----------------------------<  135<H>  3.7   |  25.0  |  0.69    Ca    8.4<L>      18 Mar 2022 03:58  Phos  4.1     03-18  Mg     2.0     03-18 03-17 @ 07:01  -  03-18 @ 07:00  --------------------------------------------------------  IN: 1600 mL / OUT: 900 mL / NET: 700 mL        RADIOLOGY & ADDITIONAL TESTS:          CAPRINI SCORE [CLOT]:  Patient has an estimated Caprini score of greater than 5.  However, the patient's unique clinical situation will be addressed in an individual manner to determine appropriate anticoagulation treatment, if any. HPI: 56 year old female with PMH MS, well controlled with Avenox injections  cerebral aneurysm found on MRI when she moved from [place] and was establishing care, she has family history of a mother who had a left posterior communicating artery aneurysm, coiled by Dr. Rangel Acevedo. MRI performed in 01/2022 showed stable, extensive, confluent, T2 FLAIR hyperintense signal within the white matter, consistent with MS, and the MRA shows a possible 3.6 mm superior hypophyseal aneurysm. Patient is s/p cerebral angiogram with moderate sedation on 2/17/2022 with Dr. Leonor Mahajan which confirmed unruptured left superior hypophyseal artery aneurysm.     INTERVAL:  Patient is now POD 1 s/p embolization with 3.75 mm X 14 mm Pipeline stent of a Left superior hypophyseal aneurysm measuring width 3.2 mm/ height 2.3 mm/ neck 3.4 mm. Patient is doing well. Had a headache last night but reports now has resolved.          Vital Signs Last 24 Hrs  T(C): 36.8 (18 Mar 2022 04:18), Max: 37.1 (17 Mar 2022 20:30)  T(F): 98.3 (18 Mar 2022 04:18), Max: 98.7 (17 Mar 2022 20:30)  HR: 59 (18 Mar 2022 06:00) (54 - 81)  BP: 90/51 (18 Mar 2022 06:00) (83/52 - 117/55)  BP(mean): 64 (18 Mar 2022 06:00) (61 - 76)  RR: 15 (18 Mar 2022 06:00) (9 - 17)  SpO2: 95% (18 Mar 2022 06:00) (95% - 100%)    PHYSICAL EXAM:  Detailed Neurologic Exam:    Mental status: The patient is awake and alert and has normal attention span.  The patient is fully oriented in 3 spheres. The patient is oriented to current events. The patient is able to name objects, follow commands, repeat sentences.    Cranial nerves: Pupils equal and react symmetrically to light. Extraocular motion is full with no nystagmus. There is no ptosis. Facial sensation is intact. Facial musculature is symmetric.  Shoulder shrug is normal.     Motor: There is normal bulk and tone. There is no tremor.  Strength is 5/5 in the right arm and leg.   Strength is 5/5 in the left arm and leg.    Sensation: Intact to light touch in 4 extremities    Cerebellar: There is no dysmetria on finger to nose testing.    Gait : deferred      LABS:                        10.9   7.14  )-----------( 215      ( 18 Mar 2022 03:58 )             32.6     03-18    141  |  107  |  8.9  ----------------------------<  135<H>  3.7   |  25.0  |  0.69    Ca    8.4<L>      18 Mar 2022 03:58  Phos  4.1     03-18  Mg     2.0     03-18 03-17 @ 07:01  -  03-18 @ 07:00  --------------------------------------------------------  IN: 1600 mL / OUT: 900 mL / NET: 700 mL        RADIOLOGY & ADDITIONAL TESTS:    < from: MR Angio Neck w/ IV Cont (03.18.22 @ 17:13) >    IMPRESSION: Extensive stable confluent white matter changes consistent   with demyelinating disease unchanged since 12/13/2021. Signal dropout in   the left supraclinoid internal carotid artery related to the presence of   a pipeline stent. Good intracranial flow using noninvasive flow MR   angiography. No acute infarcts.      < end of copied text >

## 2022-03-18 NOTE — DISCHARGE NOTE NURSING/CASE MANAGEMENT/SOCIAL WORK - PATIENT PORTAL LINK FT
You can access the FollowMyHealth Patient Portal offered by [facility] by registering at the following website: [email or web address]. By joining [facility] [employer]’s FollowMyHealth portal, you will also be able to view your health information using other applications (apps) compatible with our system.

## 2022-04-05 ENCOUNTER — APPOINTMENT (OUTPATIENT)
Dept: NEUROSURGERY | Facility: CLINIC | Age: 57
End: 2022-04-05
Payer: COMMERCIAL

## 2022-04-05 VITALS
TEMPERATURE: 97.8 F | WEIGHT: 141 LBS | SYSTOLIC BLOOD PRESSURE: 116 MMHG | HEART RATE: 68 BPM | OXYGEN SATURATION: 98 % | HEIGHT: 61 IN | DIASTOLIC BLOOD PRESSURE: 75 MMHG | BODY MASS INDEX: 26.62 KG/M2

## 2022-04-05 PROCEDURE — 99214 OFFICE O/P EST MOD 30 MIN: CPT

## 2022-04-06 NOTE — HISTORY OF PRESENT ILLNESS
[FreeTextEntry1] : Ms. Loza presents for follow up visit s/p embolization and Pipeline stent of a left superior hypophyseal artery aneurysm 3/17/22. \par Patient had diagnostic cerebral angiogram on 2/17/2022. \par She has a history of relapsing and remitting multiple sclerosis. Past surgical history tonsillectomy and a procedure for cervical carcinoma. \par She has a family history of a mother who had a left posterior communicating artery aneurysm, coiled by Dr. Rangel Acevedo. She is allergic to penicillin, and she is active and works in the [employer] [place] [employer] at [facility]. \par She is doing well and denies all complaints including headaches, n/v or vision changes. \par MRA NOVA 3/18/22 post procedure, patent stent. \par \par Plan: MRA NOVA June then fu with Dr. Valencia\par Continue Plavix and ASA

## 2022-04-06 NOTE — PHYSICAL EXAM
[FreeTextEntry1] : Awake, alert, and oriented x 3. VSS. In no apparent distress.   Short and long term memory intact.  Speech is clear and appropriate.  Affect is normal.  Voice is strong.  Respirations easy and even.  Normal skin color and pigmentation.  The sclera and conjunctiva normal.  Ears, nose, and neck normal in appearance.  EOMI, no nystagmus, facial sensation intact symmetrically, face symmetrical, hearing intact bilaterally, tongue and palate midline, head turning and shoulder shrug symmetric and no tongue deviation with protrusion.  No pronator drift.   No past-pointing, no tremors noted, no dysdiadochokinesia, and finger to nose dysmetria was not present.  Romberg negative.  \par Rises from a seated position in a comfortable fashion.\par \par Gait is well coordinated and stable without the use of an assistive device. Motor strength in the upper extremities 5/5 in the biceps, triceps, and hand .  Motor strength in the lower extremities is 5/5 in the iliopsoas, quadriceps, and hamstrings.  \par \par

## 2022-04-06 NOTE — ASSESSMENT
[FreeTextEntry1] : Impression:s/p Pipeline stent for Unruptured left superior hypophyseal artery aneurysm 3/17/2022. MRA NOVA 3/18/22 no instent stenosis. \par \par \par MRA NOVA JUNE Dheeraj Shaffer\par \par Continue Plavix 75 mg and aspirin 81 mg daily on 3/3/2022.\par Patient has been given an opportunity to ask and have their questions answered to their satisfaction.\par Patient knows to call the office if there are any new or worsening symptoms.\par She has been given an opportunity to ask and have her questions answered.

## 2022-05-31 ENCOUNTER — RX RENEWAL (OUTPATIENT)
Age: 57
End: 2022-05-31

## 2022-06-20 ENCOUNTER — RESULT REVIEW (OUTPATIENT)
Age: 57
End: 2022-06-20

## 2022-07-06 ENCOUNTER — APPOINTMENT (OUTPATIENT)
Dept: MRI IMAGING | Facility: HOSPITAL | Age: 57
End: 2022-07-06

## 2022-07-06 ENCOUNTER — OUTPATIENT (OUTPATIENT)
Dept: OUTPATIENT SERVICES | Facility: HOSPITAL | Age: 57
LOS: 1 days | End: 2022-07-06
Payer: COMMERCIAL

## 2022-07-06 DIAGNOSIS — Z98.890 OTHER SPECIFIED POSTPROCEDURAL STATES: Chronic | ICD-10-CM

## 2022-07-06 DIAGNOSIS — I67.1 CEREBRAL ANEURYSM, NONRUPTURED: ICD-10-CM

## 2022-07-06 PROCEDURE — 70544 MR ANGIOGRAPHY HEAD W/O DYE: CPT

## 2022-07-06 PROCEDURE — 70544 MR ANGIOGRAPHY HEAD W/O DYE: CPT | Mod: 26

## 2022-08-19 ENCOUNTER — APPOINTMENT (OUTPATIENT)
Dept: NEUROSURGERY | Facility: CLINIC | Age: 57
End: 2022-08-19

## 2022-08-19 PROCEDURE — 99213 OFFICE O/P EST LOW 20 MIN: CPT

## 2022-08-19 NOTE — DATA REVIEWED
[de-identified] : ACC: [id number] EXAM: MR ANGIO BRAIN\par \par PROCEDURE DATE: 07/06/2022\par \par \par \par INTERPRETATION: Clinical indication: Status post pipeline embolization of left superior hypophyseal artery aneurysm, follow-up\par \par \par 3-D axial noncontrast MRA were performed on the cervical and intracranial vessels, respectively. Intravascular flow quantification was performed using gated 2D phase contrast MR, imaged perpendicular to the vessel axis. Images were post processed NOVA software and a NOVA flow study report is available.\par \par Comparison is made with the prior NOVA MRA of 3/18/2022.\par \par \par There is mild signal dropout overlying the left cavernous internal carotid artery stent with the presence of a stent. Good intracranial flow is identified distally. In general the flow is decreased compared with the post procedure nova of 3/18/2022 which may be related to differences in patient pressure.\par \par DESIRE 261, RMCA 155, RACA 85, RACA2 81\par compared with 3/18/2022\par DESIRE 360, RMCA 211, RACA 164, RACA2 95,\par \par LICA 217, LMCA 164, LACA 77, LACA2 35\par compared with 3/18/2022\par LICA 331, LMCA 198, LACA 135, LACA2 89.\par \par , , , RPCA 67, LPCA 84\par compared with 3/18/2022\par , , , RPCA 113, LPCA 101.\par \par IMPRESSION: Stented left cavernous internal carotid artery with good intracranial flow using noninvasive flow MR angiography. Differences in total cerebral blood flow may be related to blood pressure compared with the previous exam.\par

## 2022-08-19 NOTE — ASSESSMENT
[FreeTextEntry1] : \par IMPRESSION: Stented left cavernous internal carotid artery with good intracranial flow using noninvasive flow MR angiography. Differences in total cerebral blood flow may be related to blood pressure compared with the previous exam.\par \par Plavix 75 mg daily\par Continues aspirin 81 mg daily\par Recommend angiogram to assess patency post stented left cavernous ICA 9/2022 [facility] \par PST/COVID\par \par \par We discussed in great detail that cerebral angiogram with pipeline stenting  is a minimally invasive procedure to treat her unruptured left supraclinoid ICA aneurysm.  Risks and benefits discussed. Patient verbalizes understanding of the plan.  She has been given an opportunity to ask and have her questions answered.  She wishes to proceed with said procedure.\par \par

## 2022-08-19 NOTE — REASON FOR VISIT
[FreeTextEntry1] : Hospital Course \par Discharge Date	18-Mar-2022 \par Admission Date	17-Mar-2022 07:08 \par Hospital Course	 \par 56 year old female with PMH MS, well controlled with Avenox injections \par cerebral aneurysm found on MRI when [place] moved [place] and was \par establishing care, she has family history of a mother who had a left posterior \par communicating artery aneurysm, coiled by Dr. Rangel Acevedo. MRI performed in \par 01/2022 showed stable, extensive, confluent, T2 FLAIR hyperintense signal \par within the white matter, consistent with MS, and the MRA shows a possible 3.6 \par mm superior hypophyseal aneurysm. Patient is s/p cerebral angiogram with \par moderate sedation on 2/17/2022 with Dr. Leonor Mahajan which confirmed \par unruptured left superior hypophyseal artery aneurysm. \par \par Patient admitted on 3/17/22 for planned embolization procedure. Patient is now \par POD 1 s/p embolization with 3.75 mm X 14 mm Pipeline stent of a Left superior \par hypophyseal aneurysm measuring width 3.2 mm/ height 2.3 mm/ neck 3.4 mm. \par \par Denies any headaches, nausea vomiting, or vision changes.\par Patient remains compliant with her Plavix and aspirin daily.  No bleeding from any sites.  Mild bruising on associated with Plavix.\par

## 2022-10-03 ENCOUNTER — OUTPATIENT (OUTPATIENT)
Dept: OUTPATIENT SERVICES | Facility: HOSPITAL | Age: 57
LOS: 1 days | End: 2022-10-03
Payer: COMMERCIAL

## 2022-10-03 VITALS
WEIGHT: 149.91 LBS | OXYGEN SATURATION: 96 % | RESPIRATION RATE: 18 BRPM | SYSTOLIC BLOOD PRESSURE: 122 MMHG | HEIGHT: 61 IN | TEMPERATURE: 98 F | DIASTOLIC BLOOD PRESSURE: 81 MMHG | HEART RATE: 64 BPM

## 2022-10-03 DIAGNOSIS — Z98.890 OTHER SPECIFIED POSTPROCEDURAL STATES: ICD-10-CM

## 2022-10-03 DIAGNOSIS — Z98.890 OTHER SPECIFIED POSTPROCEDURAL STATES: Chronic | ICD-10-CM

## 2022-10-03 DIAGNOSIS — I67.1 CEREBRAL ANEURYSM, NONRUPTURED: ICD-10-CM

## 2022-10-03 DIAGNOSIS — Z11.52 ENCOUNTER FOR SCREENING FOR COVID-19: ICD-10-CM

## 2022-10-03 DIAGNOSIS — Z01.818 ENCOUNTER FOR OTHER PREPROCEDURAL EXAMINATION: ICD-10-CM

## 2022-10-03 LAB
ANION GAP SERPL CALC-SCNC: 12 MMOL/L — SIGNIFICANT CHANGE UP (ref 5–17)
BLD GP AB SCN SERPL QL: NEGATIVE — SIGNIFICANT CHANGE UP
BUN SERPL-MCNC: 14 MG/DL — SIGNIFICANT CHANGE UP (ref 7–23)
CALCIUM SERPL-MCNC: 9.6 MG/DL — SIGNIFICANT CHANGE UP (ref 8.4–10.5)
CHLORIDE SERPL-SCNC: 104 MMOL/L — SIGNIFICANT CHANGE UP (ref 96–108)
CO2 SERPL-SCNC: 23 MMOL/L — SIGNIFICANT CHANGE UP (ref 22–31)
CREAT SERPL-MCNC: 0.75 MG/DL — SIGNIFICANT CHANGE UP (ref 0.5–1.3)
EGFR: 93 ML/MIN/1.73M2 — SIGNIFICANT CHANGE UP
GLUCOSE SERPL-MCNC: 91 MG/DL — SIGNIFICANT CHANGE UP (ref 70–99)
HCT VFR BLD CALC: 40.6 % — SIGNIFICANT CHANGE UP (ref 34.5–45)
HGB BLD-MCNC: 13.4 G/DL — SIGNIFICANT CHANGE UP (ref 11.5–15.5)
MCHC RBC-ENTMCNC: 29.8 PG — SIGNIFICANT CHANGE UP (ref 27–34)
MCHC RBC-ENTMCNC: 33 GM/DL — SIGNIFICANT CHANGE UP (ref 32–36)
MCV RBC AUTO: 90.4 FL — SIGNIFICANT CHANGE UP (ref 80–100)
NRBC # BLD: 0 /100 WBCS — SIGNIFICANT CHANGE UP (ref 0–0)
PA ADP PRP-ACNC: 11 PRU — LOW (ref 194–417)
PLATELET # BLD AUTO: 261 K/UL — SIGNIFICANT CHANGE UP (ref 150–400)
PLATELET RESPONSE ASPIRIN RESULT: 386 ARU — SIGNIFICANT CHANGE UP (ref 350–700)
POTASSIUM SERPL-MCNC: 4.1 MMOL/L — SIGNIFICANT CHANGE UP (ref 3.5–5.3)
POTASSIUM SERPL-SCNC: 4.1 MMOL/L — SIGNIFICANT CHANGE UP (ref 3.5–5.3)
RBC # BLD: 4.49 M/UL — SIGNIFICANT CHANGE UP (ref 3.8–5.2)
RBC # FLD: 13.3 % — SIGNIFICANT CHANGE UP (ref 10.3–14.5)
RH IG SCN BLD-IMP: POSITIVE — SIGNIFICANT CHANGE UP
SARS-COV-2 RNA SPEC QL NAA+PROBE: SIGNIFICANT CHANGE UP
SODIUM SERPL-SCNC: 139 MMOL/L — SIGNIFICANT CHANGE UP (ref 135–145)
WBC # BLD: 4.59 K/UL — SIGNIFICANT CHANGE UP (ref 3.8–10.5)
WBC # FLD AUTO: 4.59 K/UL — SIGNIFICANT CHANGE UP (ref 3.8–10.5)

## 2022-10-03 PROCEDURE — 86900 BLOOD TYPING SEROLOGIC ABO: CPT

## 2022-10-03 PROCEDURE — 86850 RBC ANTIBODY SCREEN: CPT

## 2022-10-03 PROCEDURE — G0463: CPT

## 2022-10-03 PROCEDURE — 86901 BLOOD TYPING SEROLOGIC RH(D): CPT

## 2022-10-03 PROCEDURE — 36415 COLL VENOUS BLD VENIPUNCTURE: CPT

## 2022-10-03 PROCEDURE — U0005: CPT

## 2022-10-03 PROCEDURE — U0003: CPT

## 2022-10-03 PROCEDURE — 80048 BASIC METABOLIC PNL TOTAL CA: CPT

## 2022-10-03 PROCEDURE — 85576 BLOOD PLATELET AGGREGATION: CPT

## 2022-10-03 PROCEDURE — 85027 COMPLETE CBC AUTOMATED: CPT

## 2022-10-03 PROCEDURE — C9803: CPT

## 2022-10-03 NOTE — H&P PST ADULT - PROBLEM SELECTOR PLAN 1
Cerebral angio with Dr. Valencia on 10/6/22.  Pre-op instructions given, chlorhexidine soap given    Labs: BMP, CBC, T&S, P2Y12, and aspirin platelet response. Cerebral angio with Dr. Valencia on 10/6/22.  Pre-op instructions given, chlorhexidine soap given.  Continue aspirin and Plavix.   Labs: BMP, CBC, T&S, P2Y12, and aspirin platelet response.

## 2022-10-03 NOTE — H&P PST ADULT - HISTORY OF PRESENT ILLNESS
Pt is a 56 yo female with h/o MS on Avonex, cerebral aneurysm in Feb 2022, underwent surgery 3/17/22 for repair.  Pt had routine MRI/MRA with findings of aneurysm, underwent angiogram with pipeline stenting.  Pt for follow up cerebral angio with Dr. Valencia on 10/6/22.    Pt had Covid PCR earlier today, prior to PST.    Pt is Covid vaccinated x 3.   Denies ever having Covid

## 2022-10-03 NOTE — H&P PST ADULT - GENITOURINARY
-appreciate heme recs, etoposide/cyclophosphamide until 12/23.   -outpatient follow up  monitor ANC- to restart Zarxio after d/w heme negative

## 2022-10-06 ENCOUNTER — TRANSCRIPTION ENCOUNTER (OUTPATIENT)
Age: 57
End: 2022-10-06

## 2022-10-06 ENCOUNTER — OUTPATIENT (OUTPATIENT)
Dept: OUTPATIENT SERVICES | Facility: HOSPITAL | Age: 57
LOS: 1 days | End: 2022-10-06
Payer: COMMERCIAL

## 2022-10-06 ENCOUNTER — APPOINTMENT (OUTPATIENT)
Dept: NEUROSURGERY | Facility: HOSPITAL | Age: 57
End: 2022-10-06

## 2022-10-06 VITALS
HEART RATE: 60 BPM | WEIGHT: 149.91 LBS | DIASTOLIC BLOOD PRESSURE: 73 MMHG | SYSTOLIC BLOOD PRESSURE: 139 MMHG | RESPIRATION RATE: 17 BRPM | HEIGHT: 61 IN | OXYGEN SATURATION: 99 %

## 2022-10-06 VITALS
SYSTOLIC BLOOD PRESSURE: 108 MMHG | DIASTOLIC BLOOD PRESSURE: 65 MMHG | OXYGEN SATURATION: 100 % | RESPIRATION RATE: 10 BRPM | HEART RATE: 53 BPM

## 2022-10-06 DIAGNOSIS — I67.1 CEREBRAL ANEURYSM, NONRUPTURED: ICD-10-CM

## 2022-10-06 DIAGNOSIS — Z98.890 OTHER SPECIFIED POSTPROCEDURAL STATES: Chronic | ICD-10-CM

## 2022-10-06 PROCEDURE — 36224 PLACE CATH CAROTD ART: CPT

## 2022-10-06 PROCEDURE — C1887: CPT

## 2022-10-06 PROCEDURE — C1760: CPT

## 2022-10-06 PROCEDURE — C1894: CPT

## 2022-10-06 PROCEDURE — C1769: CPT

## 2022-10-06 PROCEDURE — 36224 PLACE CATH CAROTD ART: CPT | Mod: LT

## 2022-10-06 RX ORDER — HYDROMORPHONE HYDROCHLORIDE 2 MG/ML
0.25 INJECTION INTRAMUSCULAR; INTRAVENOUS; SUBCUTANEOUS
Refills: 0 | Status: DISCONTINUED | OUTPATIENT
Start: 2022-10-06 | End: 2022-10-06

## 2022-10-06 RX ORDER — INTERFERON BETA-1A 22 UG/.5ML
0.5 INJECTION, SOLUTION SUBCUTANEOUS
Qty: 0 | Refills: 0 | DISCHARGE

## 2022-10-06 RX ORDER — ONDANSETRON 8 MG/1
4 TABLET, FILM COATED ORAL ONCE
Refills: 0 | Status: DISCONTINUED | OUTPATIENT
Start: 2022-10-06 | End: 2022-10-20

## 2022-10-06 NOTE — ASU DISCHARGE PLAN (ADULT/PEDIATRIC) - CARE PROVIDER_API CALL
Leonor Bentley)  Neurology; Vascular Neurology  [address], [address]  [place] [address]  Phone: [phone]  Fax: [phone]  Follow Up Time:

## 2022-10-06 NOTE — CHART NOTE - NSCHARTNOTEFT_GEN_A_CORE
Interventional Neuro- Radiology   Procedure Note      Procedure: Selective Cerebral Angiography   Pre- Procedure Diagnosis: left superior hypophyseal aneurysm s/p stent   Post- Procedure Diagnosis: no residual filling of aneurysm/ no in stent stenosis     : Dr. Valencia  Fellow: Dr. Amaral   Physician Assistant: Gi Claros PA-C    RN: Navneet Blanchard: Rodri     Anesthesia: Dr. Mercado (MAC)      I/Os:  Fluids: 50cc   Agee: DTV   Contrast: 20cc   Estimated Blood Loss: <10cc    Preliminary Report:  Under MAC, using a 5Fr short sheath to the right groin examination of left internal carotid artery/ right internal carotid artery via selective cerebral angiography demonstrates no residual filling of aneurysm/ no in stent stenosis. ( Official note to follow).    Patient tolerated procedure well, vital signs stable, hemodynamically stable, no change in neurological status compared to baseline. Results discussed with neurosurgery/ patient and their family. Groin sheath d/c'ed, manual compression held to hemostasis, no active bleeding, no hematoma, Vascade applied, quick clot and safeguard balloon dressing applied at 910h. Patient transferred to  IR recovery for further care/ monitoring.
Interventional Neuro Radiology  Pre-Procedure Note    This is a 56yo female with h/o MS on Avonex, cerebral aneurysm in Feb 2022, underwent surgery 3/17/22 for repair.  Pt had routine MRI/MRA with findings of aneurysm, underwent angiogram with pipeline stenting.       Neuro Exam: Awake and alert, oriented x3, fluent, normal naming and repetition, follows 3 step commands. Extraocular movements intact, no nystagmus, visual fields full, face symmetric, tongue midline. No drift, 5/5 power x 4 extremities. Normal sensation to LT. Normal finger-to-nose and rapid alternating movements.    PAST MEDICAL & SURGICAL HISTORY:  Multiple sclerosis  Cerebral aneurysm, nonruptured  Cervical ca LEEP procedure 1999      Social History:   Denies tobacco use    FAMILY HISTORY:  FH: brain aneurysm (Mother)  FH: liver cancer (Father)  Family history of diabetes mellitus (DM) (Mother)  FH: breast cancer (Mother)    Allergies:   penicillin (Anaphylaxis)      Current Medications:   · 	clopidogrel 75 mg oral tablet: Last Dose Taken:  , 1 tab(s) orally once a day  · 	aspirin 81 mg oral tablet, chewable: Last Dose Taken:  , 1 tab(s) orally once a day  · 	acetaminophen 325 mg oral tablet: Last Dose Taken:  , 2 tab(s) orally every 6 hours, As needed, Mild Pain (1 - 3)  · 	Avonex 30 mcg intramuscular injection: Last Dose Taken:  , 0.5 milliliter(s) intramuscular once a week    Labs:                         13.4   4.59  )-----------( 261      ( 03 Oct 2022 17:20 )             40.6       10-03    139  |  104  |  14  ----------------------------<  91  4.1   |  23  |  0.75          Blood Bank: 10-03-22  O  --  Positive      Assessment/Plan:   This is a 56yo female  presents with cerebral aneurysm s/p stent. Patient presents to neuro-IR for selective cerebral angiography. Procedure/ risks/ benefits/ goals/ alternatives were explained. Risks include but are not limited to stroke/ vessel injury/ hemorrhage/ groin hematoma. All questions answered. Informed content obtained from patient. Consent placed in chart.

## 2022-10-06 NOTE — ASU DISCHARGE PLAN (ADULT/PEDIATRIC) - NS MD DC FALL RISK RISK
For information on Fall & Injury Prevention, visit: [email or web address] OR  [email or web address] OR  https://www.cdc.gov/steadi/patient.html

## 2022-10-13 DIAGNOSIS — Z09 ENCOUNTER FOR FOLLOW-UP EXAMINATION AFTER COMPLETED TREATMENT FOR CONDITIONS OTHER THAN MALIGNANT NEOPLASM: ICD-10-CM

## 2022-10-21 ENCOUNTER — RX RENEWAL (OUTPATIENT)
Age: 57
End: 2022-10-21

## 2022-10-21 RX ORDER — ASPIRIN 81 MG/1
81 TABLET, COATED ORAL
Qty: 90 | Refills: 1 | Status: ACTIVE | COMMUNITY
Start: 2022-05-31 | End: 1900-01-01

## 2022-10-25 ENCOUNTER — APPOINTMENT (OUTPATIENT)
Dept: NEUROSURGERY | Facility: CLINIC | Age: 57
End: 2022-10-25

## 2022-10-25 VITALS
HEIGHT: 61 IN | WEIGHT: 141 LBS | OXYGEN SATURATION: 100 % | HEART RATE: 58 BPM | SYSTOLIC BLOOD PRESSURE: 115 MMHG | BODY MASS INDEX: 26.62 KG/M2 | DIASTOLIC BLOOD PRESSURE: 79 MMHG

## 2022-10-25 DIAGNOSIS — Z80.3 FAMILY HISTORY OF MALIGNANT NEOPLASM OF BREAST: ICD-10-CM

## 2022-10-25 DIAGNOSIS — Z80.0 FAMILY HISTORY OF MALIGNANT NEOPLASM OF DIGESTIVE ORGANS: ICD-10-CM

## 2022-10-25 DIAGNOSIS — G35 MULTIPLE SCLEROSIS: ICD-10-CM

## 2022-10-25 PROCEDURE — 99214 OFFICE O/P EST MOD 30 MIN: CPT

## 2022-10-25 RX ORDER — CLOPIDOGREL BISULFATE 75 MG/1
75 TABLET, FILM COATED ORAL DAILY
Qty: 90 | Refills: 2 | Status: DISCONTINUED | COMMUNITY
Start: 2022-04-05 | End: 2022-10-25

## 2022-10-25 RX ORDER — CLOPIDOGREL BISULFATE 75 MG/1
75 TABLET, FILM COATED ORAL
Qty: 30 | Refills: 0 | Status: DISCONTINUED | COMMUNITY
Start: 2022-03-04 | End: 2022-10-25

## 2022-10-26 PROBLEM — Z80.3 FAMILY HISTORY OF BREAST CANCER: Status: ACTIVE | Noted: 2019-04-15

## 2022-10-26 PROBLEM — G35 MULTIPLE SCLEROSIS: Status: ACTIVE | Noted: 2019-04-15

## 2022-10-26 PROBLEM — Z80.0 FAMILY HISTORY OF LIVER CANCER: Status: ACTIVE | Noted: 2019-04-15

## 2022-10-26 NOTE — RESULTS/DATA
[FreeTextEntry1] : [facility]\par    [facility] Department of Radiology\par   Radiology Report\par \par Patient Name: PORFIRIO CASTELLANOS   Report Date: 11-Oct-2022 14:33.00 \par Patient ID: [id number] (00), [id number] (EPI)  Accession No.: [id number] \par Patient Birth Date: 1965  Report Status: F \par Referring Physician: [id number] SY MEDINA   Reason For Study:  \par \par \par ACC: [id number] EXAM: IR PROCEDURE NEURO\par \par PROCEDURE DATE: 10/06/2022\par \par \par INTERPRETATION: Pre-procedure diagnosis: Interval 7 month follow up after successful PipelineFlex with Shield technology embolization of left superior hypophyseal aneurysm\par \par Post-procedure diagnosis: 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 She underwent successful endovascular embolization of left superior hypophyseal aneurysm with PipelineFlex with Shield technology during March 17, 2022. The patient now presents for 7 months interval follow up cerebral angiography. The risks, benefits, alternatives, complications and personnel associated with the procedure was discussed with the patient and the patient's family in great detail. They request that we proceed.\par \par Procedure: The patient was brought into the angiography suite and positioned on the table supine. Both femoral regions were prepped and draped in the standard sterile fashion. The skin overlying the right femoral artery was infiltrated with lidocaine and accessed using a micropuncture kit and modified Seldinger technique. A 5 Lebanese short sheath was inserted. A 5 Lebanese Cordis Vert diagnostic catheter over an angled Glidewire was navigated into the left internal carotid artery and angiography of the intracranial circulation was performed.\par \par All catheters and guidewires were removed and hemostasis was obtained with 5 Lebanese Vascade, manual compression, Quickclot and the Safeguard dressing\par \par Findings:\par \par Left internal carotid artery: There is normal transit of contrast through the arterial, capillary and venous phases. A PipelineFlex with Shield technology flow diverter is visualized at the nonsubtracted imaging overlying the area of the previously described left superior hypophyseal aneurysm. There is complete obliteration of the previously visualized left superior hypophyseal aneurysm. Neointimal hyperplasia is noticeable at the proximal portion of the flow diverter with no significant stenosis. There is brisk filling across the anterior communicating artery. A posterior communicating artery infundibulum is identified, unchanged from previous angiography. There is no evidence of other intracranial aneurysm, arteriovenous malformation or arteriovenous shunting. Multiple superficial cortical veins are identified. The superior sagittal sinus drains into the right transverse sinus, the occipital sinus and sigmoid sinuses. The vein of Frannie is identified. The territory of the vein Elian is drained by temporal veins that end in Frannie. The internal cerebral vein drains into the great vein of Sourav and the straight sinus. 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 Oct 11 2022 2:33PM

## 2022-10-26 NOTE — REASON FOR VISIT
[FreeTextEntry1] : f/u s/p 10/6/22 diagnostic angiogram at [facility]\par \par s/p 3/17/22 embolization with PL stent of Left superior hypophyseal aneurysm

## 2022-10-26 NOTE — REVIEW OF SYSTEMS
[Negative] : Endocrine [Abdominal Pain] : no abdominal pain [Vomiting] : no vomiting [Diarrhea] : no diarrhea [Dysuria] : no dysuria [Incontinence] : no incontinence [Skin Lesions] : no skin lesions [Easy Bleeding] : no tendency for easy bleeding [Easy Bruising] : no tendency for easy bruising

## 2022-10-26 NOTE — PHYSICAL EXAM
[General Appearance - Alert] : alert [General Appearance - In No Acute Distress] : in no acute distress [Oriented To Time, Place, And Person] : oriented to person, place, and time [Impaired Insight] : insight and judgment were intact [Cranial Nerves Optic (II)] : visual acuity intact bilaterally,  pupils equal round and reactive to light [Cranial Nerves Oculomotor (III)] : extraocular motion intact [Cranial Nerves Trigeminal (V)] : facial sensation intact symmetrically [Cranial Nerves Facial (VII)] : face symmetrical [Cranial Nerves Vestibulocochlear (VIII)] : hearing was intact bilaterally [Cranial Nerves Glossopharyngeal (IX)] : tongue and palate midline [Cranial Nerves Accessory (XI - Cranial And Spinal)] : head turning and shoulder shrug symmetric [Cranial Nerves Hypoglossal (XII)] : there was no tongue deviation with protrusion [5] : S1 ankle flexors 5/5 [Normal] : normal [Able to toe walk] : the patient was able to toe walk [Able to heel walk] : the patient was able to heel walk [Sclera] : the sclera and conjunctiva were normal [PERRL With Normal Accommodation] : pupils were equal in size, round, reactive to light, with normal accommodation [Hearing Threshold Finger Rub Not [place]] : hearing was normal [Neck Appearance] : the appearance of the neck was normal [] : no respiratory distress [Respiration, Rhythm And Depth] : normal respiratory rhythm and effort [Edema] : there was no peripheral edema [Abnormal Walk] : normal gait [Involuntary Movements] : no involuntary movements were seen [Motor Tone] : muscle strength and tone were normal [Skin Color & Pigmentation] : normal skin color and pigmentation [Limited Balance] : balance was intact [Tremor] : no tremor present [FreeTextEntry1] : No drift, steady gait, steady tandem walking

## 2022-10-26 NOTE — ASSESSMENT
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

## 2022-10-26 NOTE — HISTORY OF PRESENT ILLNESS
[FreeTextEntry1] : Ms. Loza presents for follow up visit s/p embolization and Pipeline stent of a left superior hypophyseal artery aneurysm 3/17/22, s/p 10/6/22 diagnostic cerebral angiogram and arrives for review and plan.\par \par She is feeling well, no complaints, no deficits.\par \par \par Patient had diagnostic cerebral angiogram on 2/17/2022. \par She has a history of relapsing and remitting multiple sclerosis. Past surgical history tonsillectomy and a procedure for cervical carcinoma. \par She has a family history of a mother who had a left posterior communicating artery aneurysm, coiled by Dr. Rangel Acevedo. She is allergic to penicillin, and she is active and works in the [employer] at [employer]. \par \par She is doing well and denies all complaints including headaches, n/v or vision changes. \par MRA NOVA 3/18/22 post procedure, patent stent. \par \par \par \par Plan: MRA NOVA June then fu with Dr. Valencia\par Continue  ASA

## 2022-10-26 NOTE — END OF VISIT
[FreeTextEntry3] : Agree with treatment plan as stated above  [Time Spent: ___ minutes] : I have spent [unfilled] minutes of time on the encounter.

## 2022-12-28 ENCOUNTER — RX RENEWAL (OUTPATIENT)
Age: 57
End: 2022-12-28

## 2023-01-13 NOTE — ED ADULT NURSE NOTE - NS_SISCREENINGSR_GEN_ALL_ED
Received via ambulance to [id number]. Oriented to call light phone and menu system . Up with cane slow gait . Dry SKIN NOTED WITH  PATCHES OF PSOARSIS TO ELBOW AND KNEES. Alert oriented  To place time  . Settled .  Buttocks pink and blanchable, groin and thigh creases are  dk pink-red  Ears  Intact  Elbows patchy with some scabs noted .Lungs upper clear diminshed mid to base wit insp wheeze . Passing flatus , lbm Monday or Tuesday . Voiding adequately . Abdomen is large  Non tender  . Feet and legs are edamtous  +2 pedal  Non pitting to legs . Very dry flaky  Skin to feet  Toenails have not been addressed in quite awhile  Long and  Discolored , heels are pink and blanchable    Negative

## 2023-10-28 ENCOUNTER — RESULT REVIEW (OUTPATIENT)
Age: 58
End: 2023-10-28

## 2023-10-30 ENCOUNTER — NON-APPOINTMENT (OUTPATIENT)
Age: 58
End: 2023-10-30

## 2023-11-11 ENCOUNTER — OUTPATIENT (OUTPATIENT)
Dept: OUTPATIENT SERVICES | Facility: HOSPITAL | Age: 58
LOS: 1 days | End: 2023-11-11
Payer: COMMERCIAL

## 2023-11-11 ENCOUNTER — APPOINTMENT (OUTPATIENT)
Dept: MRI IMAGING | Facility: HOSPITAL | Age: 58
End: 2023-11-11

## 2023-11-11 ENCOUNTER — APPOINTMENT (OUTPATIENT)
Dept: MRI IMAGING | Facility: HOSPITAL | Age: 58
End: 2023-11-11
Payer: COMMERCIAL

## 2023-11-11 DIAGNOSIS — Z98.890 OTHER SPECIFIED POSTPROCEDURAL STATES: Chronic | ICD-10-CM

## 2023-11-11 DIAGNOSIS — I67.1 CEREBRAL ANEURYSM, NONRUPTURED: ICD-10-CM

## 2023-11-11 PROCEDURE — 70546 MR ANGIOGRAPH HEAD W/O&W/DYE: CPT | Mod: 26

## 2023-11-11 PROCEDURE — A9579: CPT

## 2023-11-11 PROCEDURE — 70549 MR ANGIOGRAPH NECK W/O&W/DYE: CPT

## 2023-11-11 PROCEDURE — 70549 MR ANGIOGRAPH NECK W/O&W/DYE: CPT | Mod: 26

## 2023-11-11 PROCEDURE — 70546 MR ANGIOGRAPH HEAD W/O&W/DYE: CPT

## 2023-11-14 ENCOUNTER — APPOINTMENT (OUTPATIENT)
Dept: NEUROSURGERY | Facility: CLINIC | Age: 58
End: 2023-11-14
Payer: COMMERCIAL

## 2023-11-14 VITALS
DIASTOLIC BLOOD PRESSURE: 74 MMHG | OXYGEN SATURATION: 96 % | WEIGHT: 144 LBS | SYSTOLIC BLOOD PRESSURE: 109 MMHG | BODY MASS INDEX: 27.19 KG/M2 | HEIGHT: 61 IN | HEART RATE: 81 BPM

## 2023-11-14 DIAGNOSIS — Z98.890 OTHER SPECIFIED POSTPROCEDURAL STATES: ICD-10-CM

## 2023-11-14 DIAGNOSIS — I67.1 CEREBRAL ANEURYSM, NONRUPTURED: ICD-10-CM

## 2023-11-14 DIAGNOSIS — Z86.79 OTHER SPECIFIED POSTPROCEDURAL STATES: ICD-10-CM

## 2023-11-14 PROCEDURE — 99214 OFFICE O/P EST MOD 30 MIN: CPT

## 2023-11-14 RX ORDER — ASPIRIN ENTERIC COATED TABLETS 81 MG 81 MG/1
81 TABLET, DELAYED RELEASE ORAL
Qty: 90 | Refills: 1 | Status: DISCONTINUED | COMMUNITY
Start: 2022-03-04 | End: 2023-11-14

## 2023-12-02 ENCOUNTER — OUTPATIENT (OUTPATIENT)
Dept: OUTPATIENT SERVICES | Facility: HOSPITAL | Age: 58
LOS: 1 days | End: 2023-12-02
Payer: COMMERCIAL

## 2023-12-02 ENCOUNTER — APPOINTMENT (OUTPATIENT)
Dept: MRI IMAGING | Facility: HOSPITAL | Age: 58
End: 2023-12-02
Payer: COMMERCIAL

## 2023-12-02 DIAGNOSIS — Z00.8 ENCOUNTER FOR OTHER GENERAL EXAMINATION: ICD-10-CM

## 2023-12-02 DIAGNOSIS — Z98.890 OTHER SPECIFIED POSTPROCEDURAL STATES: Chronic | ICD-10-CM

## 2023-12-02 PROCEDURE — 70553 MRI BRAIN STEM W/O & W/DYE: CPT

## 2023-12-02 PROCEDURE — 70553 MRI BRAIN STEM W/O & W/DYE: CPT | Mod: 26

## 2024-04-19 ENCOUNTER — APPOINTMENT (OUTPATIENT)
Dept: MRI IMAGING | Facility: HOSPITAL | Age: 59
End: 2024-04-19
Payer: COMMERCIAL

## 2024-04-19 ENCOUNTER — OUTPATIENT (OUTPATIENT)
Dept: OUTPATIENT SERVICES | Facility: HOSPITAL | Age: 59
LOS: 1 days | End: 2024-04-19
Payer: COMMERCIAL

## 2024-04-19 DIAGNOSIS — I67.1 CEREBRAL ANEURYSM, NONRUPTURED: ICD-10-CM

## 2024-04-19 DIAGNOSIS — Z98.890 OTHER SPECIFIED POSTPROCEDURAL STATES: Chronic | ICD-10-CM

## 2024-04-19 PROCEDURE — A9579: CPT

## 2024-04-19 PROCEDURE — 70553 MRI BRAIN STEM W/O & W/DYE: CPT | Mod: 26

## 2024-04-19 PROCEDURE — 70553 MRI BRAIN STEM W/O & W/DYE: CPT

## 2024-04-30 ENCOUNTER — APPOINTMENT (OUTPATIENT)
Dept: NEUROSURGERY | Facility: CLINIC | Age: 59
End: 2024-04-30
Payer: COMMERCIAL

## 2024-04-30 PROCEDURE — 99441: CPT

## 2024-05-06 NOTE — END OF VISIT
[FreeTextEntry3] :  I have seen and evaluated patient with NP Heidi Hernandez who has completed the documentation above. [Time Spent: ___ minutes] : I have spent [unfilled] minutes of time on the encounter.

## 2024-05-06 NOTE — HISTORY OF PRESENT ILLNESS
[Home] : at home, [unfilled] , at the time of the visit. [Medical Office: ([facility])___] : at the medical office located in  [Verbal consent obtained from patient] : the patient, [unfilled] [FreeTextEntry1] : EMANUEL MONROY is a 59 year old female with PMH of relapsing and remitting multiple sclerosis. PSH includes tonsillectomy and a procedure for cervical carcinoma. She has a family history of a mother who had a left posterior communicating artery aneurysm, coiled by Dr. Rangel Acevedo. She is allergic to penicillin, and she is active and works in the [employer] [place] [employer] at [employer]. On 2/17/22, she underwent diagnostic cerebral angiogram. On 3/17/22, she underwent embolization and Pipeline stent of a left superior hypophyseal artery aneurysm. MRA NOVA 3/18/22 post procedure, patent stent. On 10/6/22, she underwent follow up cerebral angiogram. Plavix was discontinued after angiogram 10/6/22 by - no longer indicated.  4/30/24:  pt arrives via [facility] for 6 month f/u s/p 3/17/22 PL embo/stent, f/u cerebral angiogram 10/2022, and stable MRA 11/11/23.  Dr. Pablo who follows her for MS will review 4/19/24 MRI head they obtained.   She is feeling well.  Denies headaches, nausea/vomiting, focal weakness.   11/14/23:  she returns for a follow up to review results of MRA head/neck done 11/11/23. She denies all complaints including headaches, n/v or vision changes. Compliant with aspirin 81mg daily. She is adjusting to living in [place], [place] doing well.

## 2024-05-06 NOTE — ASSESSMENT
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 Plavix was discontinued after angiogram 10/6/22 - no longer indicated. Repeat MRA brain stable 11/11/23.   Patient doing clinically well. Denies headaches and other symptoms of motor, sensory, speech, or visual abnormalities. Compliant with aspirin 81mg daily.  PLAN: Continue ASA 81mg indefinitely MRA head non con/ NOVA protocol - asap (3 months fine/first available) RTO 4 months   Will discuss indication for any additional cerebral angiogram with team.   Continue to follow up with PCP for BP, LDL management

## 2024-07-29 ENCOUNTER — RESULT REVIEW (OUTPATIENT)
Age: 59
End: 2024-07-29

## 2024-07-29 ENCOUNTER — OUTPATIENT (OUTPATIENT)
Dept: OUTPATIENT SERVICES | Facility: HOSPITAL | Age: 59
LOS: 1 days | End: 2024-07-29
Payer: COMMERCIAL

## 2024-07-29 ENCOUNTER — APPOINTMENT (OUTPATIENT)
Dept: MRI IMAGING | Facility: HOSPITAL | Age: 59
End: 2024-07-29

## 2024-07-29 DIAGNOSIS — I67.1 CEREBRAL ANEURYSM, NONRUPTURED: ICD-10-CM

## 2024-07-29 DIAGNOSIS — Z98.890 OTHER SPECIFIED POSTPROCEDURAL STATES: ICD-10-CM

## 2024-07-29 DIAGNOSIS — Z98.890 OTHER SPECIFIED POSTPROCEDURAL STATES: Chronic | ICD-10-CM

## 2024-07-29 PROCEDURE — 70544 MR ANGIOGRAPHY HEAD W/O DYE: CPT | Mod: 26

## 2024-07-29 PROCEDURE — 70544 MR ANGIOGRAPHY HEAD W/O DYE: CPT

## 2024-08-05 NOTE — HISTORY OF PRESENT ILLNESS
[FreeTextEntry1] : EMANUEL MONROY is a 59 year old female with PMH of relapsing and remitting multiple sclerosis, followed by Dr. Pablo. PSH includes tonsillectomy and a procedure for cervical carcinoma. She has a family history of a mother who had a left posterior communicating artery aneurysm, coiled by Dr. Rangel Acevedo. She is allergic to penicillin, and she is active and works in the [employer] [place] [employer] at [employer]. On 2/17/22, she underwent diagnostic cerebral angiogram. On 3/17/22, she underwent embolization and Pipeline stent of a left superior hypophyseal artery aneurysm. MRA NOVA 3/18/22 post procedure, patent stent. On 10/6/22, she underwent follow up cerebral angiogram. Plavix was discontinued after angiogram 10/6/22 by Dr. Valencia- no longer indicated. Stable MRA 11/11/23.   Today, patient presents for follow up to review results of MRA brain NOVA obtained on 7/29/24. [[She is feeling well. Denies headaches, nausea/vomiting, focal weakness.]]

## 2024-08-05 NOTE — ASSESSMENT
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 Plavix was discontinued after angiogram 10/6/22 - no longer indicated. Repeat MRA brain stable 11/11/23.  Repeat MRA brain NOVA 7/29/24 shows Stented left supraclinoid internal carotid artery for superior apophyseal artery aneurysm with good intracranial flow using noninvasive flow MR angiography.  [[Patient doing clinically well. Denies headaches and other symptoms of motor, sensory, speech, or visual abnormalities. Compliant with aspirin 81mg daily.]]   Last PLAN 4/30/24: Continue ASA 81mg indefinitely MRA head non con/ NOVA protocol - asap (3 months fine/first available) RTO 4 months Will discuss indication for any additional cerebral angiogram with team. Continue to follow up with PCP for BP, LDL management.

## 2024-08-06 ENCOUNTER — APPOINTMENT (OUTPATIENT)
Dept: NEUROSURGERY | Facility: CLINIC | Age: 59
End: 2024-08-06

## 2024-08-06 PROCEDURE — 99441: CPT

## 2024-08-06 NOTE — END OF VISIT
[FreeTextEntry3] :  I have seen and evaluated patient with Nurse practitioner Lina Mendoza and agree with assessment and treatment plan as stated above. [Time Spent: ___ minutes] : I have spent [unfilled] minutes of time on the encounter.

## 2024-08-06 NOTE — HISTORY OF PRESENT ILLNESS
[FreeTextEntry1] : EMANUEL MONROY is a 59 year old female with PMH of relapsing and remitting multiple sclerosis, followed by Dr. Pablo. PSH includes tonsillectomy and a procedure for cervical carcinoma. She has a family history of a mother who had a left posterior communicating artery aneurysm, coiled by Dr. Rangel Acevedo. She is allergic to penicillin, and she is active and works in the [employer] [place] [employer] at [employer]. On 2/17/22, she underwent diagnostic cerebral angiogram. On 3/17/22, she underwent embolization and Pipeline stent of a left superior hypophyseal artery aneurysm. MRA NOVA 3/18/22 post procedure, patent stent. On 10/6/22, she underwent follow up cerebral angiogram. Plavix was discontinued after angiogram 10/6/22 by Dr. Valencia- no longer indicated. Stable MRA 11/11/23.   Today, patient presents for follow up to review results of MRA brain NOVA obtained on 7/29/24. She is feeling well. Denies headaches, nausea/vomiting, focal weakness.

## 2024-08-06 NOTE — ASSESSMENT
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 Plavix was discontinued after angiogram 10/6/22 - no longer indicated. Repeat MRA brain stable 11/11/23.  Repeat MRA brain NOVA 7/29/24 shows stented left supraclinoid internal carotid artery for superior apophyseal artery aneurysm with good intracranial flow using noninvasive flow MR angiography.  Patient doing clinically well. Denies headaches and other symptoms of motor, sensory, speech, or visual abnormalities. Compliant with aspirin 81mg daily.   PLAN: Continue ASA 81mg indefinitely MRA head w/ IV contrast in 3 years - July 2027 Follow up after for review Continue to follow up with PCP for BP, LDL management.

## 2024-08-16 NOTE — ASU DISCHARGE PLAN (ADULT/PEDIATRIC) - DISCHARGE PLAN IS COMPLETE AND GIVEN TO PATIENT
Chart and labs reviewed for length of stay. No nutrition intervention indicated at this time. RD available via consult. Will continue to follow per protocol.      : Yes